# Patient Record
Sex: MALE | Race: WHITE | NOT HISPANIC OR LATINO | URBAN - METROPOLITAN AREA
[De-identification: names, ages, dates, MRNs, and addresses within clinical notes are randomized per-mention and may not be internally consistent; named-entity substitution may affect disease eponyms.]

---

## 2017-01-05 DIAGNOSIS — E78.5 DYSLIPIDEMIA: ICD-10-CM

## 2017-01-05 RX ORDER — ATORVASTATIN CALCIUM 20 MG/1
20 TABLET, FILM COATED ORAL EVERY EVENING
Qty: 30 TAB | Refills: 3 | Status: SHIPPED | OUTPATIENT
Start: 2017-01-05 | End: 2017-04-10 | Stop reason: SDUPTHER

## 2017-02-27 ENCOUNTER — OFFICE VISIT (OUTPATIENT)
Dept: MEDICAL GROUP | Facility: MEDICAL CENTER | Age: 62
End: 2017-02-27
Attending: NURSE PRACTITIONER
Payer: MEDICAID

## 2017-02-27 VITALS
RESPIRATION RATE: 16 BRPM | HEIGHT: 72 IN | OXYGEN SATURATION: 93 % | SYSTOLIC BLOOD PRESSURE: 110 MMHG | BODY MASS INDEX: 26.55 KG/M2 | TEMPERATURE: 97.9 F | HEART RATE: 88 BPM | DIASTOLIC BLOOD PRESSURE: 68 MMHG | WEIGHT: 196 LBS

## 2017-02-27 DIAGNOSIS — Z00.00 ROUTINE HEALTH MAINTENANCE: ICD-10-CM

## 2017-02-27 DIAGNOSIS — R09.81 NASAL CONGESTION: ICD-10-CM

## 2017-02-27 DIAGNOSIS — Z13.29 SCREENING FOR THYROID DISORDER: ICD-10-CM

## 2017-02-27 DIAGNOSIS — Z13.21 ENCOUNTER FOR VITAMIN DEFICIENCY SCREENING: ICD-10-CM

## 2017-02-27 DIAGNOSIS — E78.49 OTHER HYPERLIPIDEMIA: ICD-10-CM

## 2017-02-27 DIAGNOSIS — Z12.5 SCREENING FOR PROSTATE CANCER: ICD-10-CM

## 2017-02-27 DIAGNOSIS — Z86.2 HX OF IRON DEFICIENCY ANEMIA: ICD-10-CM

## 2017-02-27 DIAGNOSIS — M54.50 ACUTE MIDLINE LOW BACK PAIN WITHOUT SCIATICA: ICD-10-CM

## 2017-02-27 DIAGNOSIS — Z72.0 TOBACCO USE: ICD-10-CM

## 2017-02-27 PROCEDURE — 99213 OFFICE O/P EST LOW 20 MIN: CPT | Performed by: NURSE PRACTITIONER

## 2017-02-27 NOTE — ASSESSMENT & PLAN NOTE
Is doing back exercises daily.   Denies need for muscle relaxant.  States back feels good today.  Rarely uses Ibuprofen.

## 2017-02-27 NOTE — PROGRESS NOTES
"      Chief Complaint: follow up on multiple medical conditions.    HPI:  Jesus presents to the clinic for follow up on multiple medical conditions.  He was last seen in the clinic in August 2016.    His PMH includes:    Appendectomy  Right Knee Surgery  Tobacco Use-Smoker (Cigars0  Exertional SOB  Mild normocytic Anemia  Hyperlipidemia  Skin Lesions   Seborrheic Keratosis and Psoriasis per Colleen Casey (DERM)  Chronic Pain ( Left Hip and Low Back)  Right Shoulder DJD (Severe)  Cough-Abn CXR---> CT Scan shows RUL Scarring      Established in past with   Dermatology- Colleen Casey  Orthopedics- JOCELYN    Review of Records shows  8/10/16 Clinic Visit for nasal congestion, tobacco cessation assist w Nicoderm Patch RX, Albuterol for SOB and chronic Bronchitis/cought. Flexeril and Motrin for low back pain and left hip pain.  5/10/16 Clinic Visit, Atorvastatin refill, Motrin for pain. Albuterol RX     [place]  Report shows  No entries    Tobacco use  Pt reports he used the Nicotine patches and stopped smoking at end of Oct 2016.  States breathing is \"pretty good\" except if allergie or weather changes.    Nasal congestion  Has on and off nasal congestion.  Denies current congestion.   Has had some sneezing today.  Denies cough.    Hyperlipidemia  Pt is taking Lipitor for his high cholesterol. Denies myalgias.  We discussed rechecking labs in next few months.  I recommended that.    Low back pain  Is doing back exercises daily.   Denies need for muscle relaxant.  States back feels good today.  Rarely uses Ibuprofen.        Patient Active Problem List    Diagnosis Date Noted   • Nasal congestion 08/10/2016   • Tobacco use 05/10/2016   • Osteoarthritis of shoulder 04/07/2016   • Chronic right shoulder pain 02/16/2016   • Left foot pain 11/23/2015   • Cough 11/23/2015   • Hyperlipidemia 08/18/2015   • Skin lesions 08/18/2015   • Hip pain 05/04/2015   • Low back pain 05/04/2015       Allergies:Review of patient's allergies indicates no " "known allergies.    Current Outpatient Prescriptions   Medication Sig Dispense Refill   • atorvastatin (LIPITOR) 20 MG Tab Take 1 Tab by mouth every evening. 30 Tab 3   • albuterol 108 (90 BASE) MCG/ACT Aero Soln inhalation aerosol Inhale 2 Puffs by mouth every 6 hours as needed for Shortness of Breath. 8.5 g 3   • ibuprofen (MOTRIN) 800 MG Tab Take 1 Tab by mouth every 8 hours as needed for Mild Pain or Moderate Pain (take with food). 60 Tab 3     No current facility-administered medications for this visit.       Social History   Substance Use Topics   • Smoking status: Former Smoker -- 0.25 packs/day for 15 years   • Smokeless tobacco: Former User     Quit date: 10/31/2016   • Alcohol Use: Yes      Comment: rare, beer every couple months       Family History   Problem Relation Age of Onset   • Cancer Mother      breast cancer, Ovarian cancer   • Heart Disease Father    • Psychiatry Maternal Aunt        ROS:  Review of Systems   See HPI Above        Exam:  Blood pressure 110/68, pulse 88, temperature 36.6 °C (97.9 °F), resp. rate 16, height 1.829 m (6' 0.01\"), weight 88.905 kg (196 lb), SpO2 93 %.  General:  Well nourished, well developed male in NAD  HENT:Head is grossly normal. PERRL. Posterior Pharynx wnl. Ear canal clear and TMs normal  Neck: Supple. Trachea is midline.  Pulmonary: Clear to ausculation .  Normal effort. No rales, ronchi, or wheezing.   Cardiovascular: Regular rate and rhythm.  Abdomen-Abdomen is soft, No tenderness.  Upper extremities- Strong = . Good ROM  Lower extremities- neg for edema, redness, tenderness.  Neuro- A & O x 4. Speech clear and appropriate.     Current medications, allergies, and problem list reviewed with patient and updated in  [facility] today.    Assessment/Plan:  1. Screening for thyroid disorder  TSH   2. Encounter for vitamin deficiency screening  VITAMIN D,25 HYDROXY    VITAMIN B12   3. Other hyperlipidemia  COMP METABOLIC PANEL    LIPID PROFILE   4. Screening for " prostate cancer  PROSTATE SPECIFIC AG DIAGNOSTIC   5. Routine health maintenance  CBC WITH DIFFERENTIAL   6. Tobacco use-IN REMISSION (QUIT 10/2016) Pt to not return to smoking   7. Nasal congestion  Nasal sterile saline spray prn. Avoid allergens.   8. Hx of iron deficiency anemia  IRON/TOTAL IRON BIND    FERRITIN   9. Acute midline low back pain without sciatica  Continue gentle stretching. Motrin sparingly w food.   Follow up in 6 weeks to review lab results. Call or return if questions, concerns, or worsening condition.

## 2017-02-27 NOTE — MR AVS SNAPSHOT
"Jesus Rowe   2017 3:30 PM   Office Visit   MRN: [id number]    Department:  [facility]   Dept Phone:  [phone]    Description:  Male : 1955   Provider:  NINFA Valle           Reason for Visit     Follow-Up           Allergies as of 2017     No Known Allergies      You were diagnosed with     Screening for thyroid disorder   [V77.0.ICD-9-CM]       Encounter for vitamin deficiency screening   [202460]       Other hyperlipidemia   [0958467]       Screening for prostate cancer   [223616]       Routine health maintenance   [185709]       Tobacco use   [403596]       Nasal congestion   [583318]       Hx of iron deficiency anemia   [751223]       Acute midline low back pain without sciatica   [1121121]         Vital Signs     Blood Pressure Pulse Temperature Respirations Height Weight    110/68 mmHg 88 36.6 °C (97.9 °F) 16 1.829 m (6' 0.01\") 88.905 kg (196 lb)    Body Mass Index Oxygen Saturation Smoking Status             26.58 kg/m2 93% Former Smoker         Basic Information     Date Of Birth Sex Race Ethnicity Preferred Language    1955 Male White Non- English      Your appointments     Apr 10, 2017  1:50 PM   Established Patient with NINFA Valle   [facility]    [address] [place] [id number] [phone]           You will be receiving a confirmation call a few days before your appointment from our automated call confirmation system.              Problem List              ICD-10-CM Priority Class Noted - Resolved    Hip pain M25.559   2015 - Present    Low back pain M54.5   2015 - Present    Hyperlipidemia E78.5   2015 - Present    Skin lesions L98.9   2015 - Present    Left foot pain M79.672   2015 - Present    Cough R05   2015 - Present    Chronic right shoulder pain M25.511, G89.29   2016 - Present    Osteoarthritis of shoulder M19.019   2016 - Present    Tobacco use " Z72.0   5/10/2016 - Present    Nasal congestion R09.81   8/10/2016 - Present      Health Maintenance        Date Due Completion Dates    IMM DTaP/Tdap/Td Vaccine (1 - Tdap) 3/14/1974 ---    IMM PNEUMOCOCCAL 19-64 (ADULT) MEDIUM RISK SERIES (1 of 1 - PPSV23) 3/14/1974 ---    COLONOSCOPY 3/14/2005 ---    IMM ZOSTER VACCINE 3/14/2015 ---    IMM INFLUENZA (1) 9/1/2016 10/5/2015            Current Immunizations     Influenza Vaccine Adult HD 10/5/2015      Below and/or attached are the medications your provider expects you to take. Review all of your home medications and newly ordered medications with your provider and/or pharmacist. Follow medication instructions as directed by your provider and/or pharmacist. Please keep your medication list with you and share with your provider. Update the information when medications are discontinued, doses are changed, or new medications (including over-the-counter products) are added; and carry medication information at all times in the event of emergency situations     Allergies:  No Known Allergies          Medications  Valid as of: February 27, 2017 -  3:45 PM    Generic Name Brand Name Tablet Size Instructions for use    Albuterol Sulfate (Aero Soln) albuterol 108 (90 BASE) MCG/ACT Inhale 2 Puffs by mouth every 6 hours as needed for Shortness of Breath.        Atorvastatin Calcium (Tab) LIPITOR 20 MG Take 1 Tab by mouth every evening.        Ibuprofen (Tab) MOTRIN 800 MG Take 1 Tab by mouth every 8 hours as needed for Mild Pain or Moderate Pain (take with food).        .                 Medicines prescribed today were sent to:     [facility] [address] [address]    Phone: [phone] Fax: [phone]    Open 24 Hours?: No      Medication refill instructions:       If your prescription bottle indicates you have medication refills left, it is not necessary to call your provider’s office. Please contact your pharmacy and they  will refill your medication.    If your prescription bottle indicates you do not have any refills left, you may request refills at any time through one of the following ways: The online [employer] system (except Urgent Care), by calling your provider’s office, or by asking your pharmacy to contact your provider’s office with a refill request. Medication refills are processed only during regular business hours and may not be available until the next business day. Your provider may request additional information or to have a follow-up visit with you prior to refilling your medication.   *Please Note: Medication refills are assigned a new Rx number when refilled electronically. Your pharmacy may indicate that no refills were authorized even though a new prescription for the same medication is available at the pharmacy. Please request the medicine by name with the pharmacy before contacting your provider for a refill.        Your To Do List     Future Labs/Procedures Complete By Expires    CBC WITH DIFFERENTIAL  As directed 2/27/2018    COMP METABOLIC PANEL  As directed 2/27/2018    FERRITIN  As directed 2/27/2018    IRON/TOTAL IRON BIND  As directed 2/27/2018    LIPID PROFILE  As directed 2/27/2018    PROSTATE SPECIFIC AG DIAGNOSTIC  As directed 2/27/2018    TSH  As directed 2/27/2018    VITAMIN B12  As directed 2/27/2018    VITAMIN D,25 HYDROXY  As directed 2/27/2018      Other Notes About Your Plan     2/26/16 [facility] Dermatology appt- Seborrheic keratosis, melanocytic nevus on trunk. Sun block, Psoriasis- Hydrocortisone.  6/15 [facility] Arrived. Lipids high, TSH normal, CBC and CMP normal. Pt to start on Statin.  6/1/5 [facility] Therapy: L Hip and Low back Pain: Recommends 2x/wk 6 wks therapy.           [place] Access Code: Activation code not generated  Current [employer] Status: Active

## 2017-02-27 NOTE — ASSESSMENT & PLAN NOTE
Pt is taking Lipitor for his high cholesterol. Denies myalgias.  We discussed rechecking labs in next few months.  I recommended that.

## 2017-02-27 NOTE — ASSESSMENT & PLAN NOTE
"Pt reports he used the Nicotine patches and stopped smoking at end of Oct 2016.  States breathing is \"pretty good\" except if allergie or weather changes.  "

## 2017-04-03 ENCOUNTER — HOSPITAL ENCOUNTER (OUTPATIENT)
Dept: LAB | Facility: MEDICAL CENTER | Age: 62
End: 2017-04-03
Attending: NURSE PRACTITIONER
Payer: MEDICAID

## 2017-04-03 DIAGNOSIS — E78.49 OTHER HYPERLIPIDEMIA: ICD-10-CM

## 2017-04-03 DIAGNOSIS — Z13.29 SCREENING FOR THYROID DISORDER: ICD-10-CM

## 2017-04-03 DIAGNOSIS — Z86.2 HX OF IRON DEFICIENCY ANEMIA: ICD-10-CM

## 2017-04-03 DIAGNOSIS — Z00.00 ROUTINE HEALTH MAINTENANCE: ICD-10-CM

## 2017-04-03 DIAGNOSIS — Z13.21 ENCOUNTER FOR VITAMIN DEFICIENCY SCREENING: ICD-10-CM

## 2017-04-03 DIAGNOSIS — Z12.5 SCREENING FOR PROSTATE CANCER: ICD-10-CM

## 2017-04-03 LAB
25(OH)D3 SERPL-MCNC: 37 NG/ML (ref 30–100)
ALBUMIN SERPL BCP-MCNC: 4.2 G/DL (ref 3.2–4.9)
ALBUMIN/GLOB SERPL: 1.4 G/DL
ALP SERPL-CCNC: 57 U/L (ref 30–99)
ALT SERPL-CCNC: 15 U/L (ref 2–50)
ANION GAP SERPL CALC-SCNC: 5 MMOL/L (ref 0–11.9)
AST SERPL-CCNC: 22 U/L (ref 12–45)
BASOPHILS # BLD AUTO: 0.7 % (ref 0–1.8)
BASOPHILS # BLD: 0.03 K/UL (ref 0–0.12)
BILIRUB SERPL-MCNC: 0.4 MG/DL (ref 0.1–1.5)
BUN SERPL-MCNC: 13 MG/DL (ref 8–22)
CALCIUM SERPL-MCNC: 9.4 MG/DL (ref 8.5–10.5)
CHLORIDE SERPL-SCNC: 105 MMOL/L (ref 96–112)
CHOLEST SERPL-MCNC: 190 MG/DL (ref 100–199)
CO2 SERPL-SCNC: 28 MMOL/L (ref 20–33)
CREAT SERPL-MCNC: 0.83 MG/DL (ref 0.5–1.4)
EOSINOPHIL # BLD AUTO: 0.12 K/UL (ref 0–0.51)
EOSINOPHIL NFR BLD: 2.6 % (ref 0–6.9)
ERYTHROCYTE [DISTWIDTH] IN BLOOD BY AUTOMATED COUNT: 46.4 FL (ref 35.9–50)
FERRITIN SERPL-MCNC: 57.5 NG/ML (ref 22–322)
GFR SERPL CREATININE-BSD FRML MDRD: >60 ML/MIN/1.73 M 2
GLOBULIN SER CALC-MCNC: 3 G/DL (ref 1.9–3.5)
GLUCOSE SERPL-MCNC: 96 MG/DL (ref 65–99)
HCT VFR BLD AUTO: 40.9 % (ref 42–52)
HDLC SERPL-MCNC: 48 MG/DL
HGB BLD-MCNC: 13.5 G/DL (ref 14–18)
IMM GRANULOCYTES # BLD AUTO: 0.01 K/UL (ref 0–0.11)
IMM GRANULOCYTES NFR BLD AUTO: 0.2 % (ref 0–0.9)
IRON SATN MFR SERPL: 25 % (ref 15–55)
IRON SERPL-MCNC: 84 UG/DL (ref 50–180)
LDLC SERPL CALC-MCNC: 121 MG/DL
LYMPHOCYTES # BLD AUTO: 2.35 K/UL (ref 1–4.8)
LYMPHOCYTES NFR BLD: 51 % (ref 22–41)
MCH RBC QN AUTO: 30.7 PG (ref 27–33)
MCHC RBC AUTO-ENTMCNC: 33 G/DL (ref 33.7–35.3)
MCV RBC AUTO: 93 FL (ref 81.4–97.8)
MONOCYTES # BLD AUTO: 0.29 K/UL (ref 0–0.85)
MONOCYTES NFR BLD AUTO: 6.3 % (ref 0–13.4)
NEUTROPHILS # BLD AUTO: 1.81 K/UL (ref 1.82–7.42)
NEUTROPHILS NFR BLD: 39.2 % (ref 44–72)
NRBC # BLD AUTO: 0 K/UL
NRBC BLD AUTO-RTO: 0 /100 WBC
PLATELET # BLD AUTO: 156 K/UL (ref 164–446)
PMV BLD AUTO: 10.5 FL (ref 9–12.9)
POTASSIUM SERPL-SCNC: 4.2 MMOL/L (ref 3.6–5.5)
PROT SERPL-MCNC: 7.2 G/DL (ref 6–8.2)
PSA SERPL-MCNC: 0.33 NG/ML (ref 0–4)
RBC # BLD AUTO: 4.4 M/UL (ref 4.7–6.1)
SODIUM SERPL-SCNC: 138 MMOL/L (ref 135–145)
TIBC SERPL-MCNC: 335 UG/DL (ref 250–450)
TRIGL SERPL-MCNC: 107 MG/DL (ref 0–149)
TSH SERPL DL<=0.005 MIU/L-ACNC: 1.6 UIU/ML (ref 0.3–3.7)
VIT B12 SERPL-MCNC: 331 PG/ML (ref 211–911)
WBC # BLD AUTO: 4.6 K/UL (ref 4.8–10.8)

## 2017-04-03 PROCEDURE — 84443 ASSAY THYROID STIM HORMONE: CPT

## 2017-04-03 PROCEDURE — 82306 VITAMIN D 25 HYDROXY: CPT

## 2017-04-03 PROCEDURE — 83540 ASSAY OF IRON: CPT

## 2017-04-03 PROCEDURE — 80061 LIPID PANEL: CPT

## 2017-04-03 PROCEDURE — 82607 VITAMIN B-12: CPT

## 2017-04-03 PROCEDURE — 80053 COMPREHEN METABOLIC PANEL: CPT

## 2017-04-03 PROCEDURE — 36415 COLL VENOUS BLD VENIPUNCTURE: CPT

## 2017-04-03 PROCEDURE — 83550 IRON BINDING TEST: CPT

## 2017-04-03 PROCEDURE — 82728 ASSAY OF FERRITIN: CPT

## 2017-04-03 PROCEDURE — 84153 ASSAY OF PSA TOTAL: CPT

## 2017-04-03 PROCEDURE — 85025 COMPLETE CBC W/AUTO DIFF WBC: CPT

## 2017-04-10 ENCOUNTER — OFFICE VISIT (OUTPATIENT)
Dept: MEDICAL GROUP | Facility: MEDICAL CENTER | Age: 62
End: 2017-04-10
Attending: NURSE PRACTITIONER
Payer: MEDICAID

## 2017-04-10 VITALS
HEART RATE: 86 BPM | TEMPERATURE: 97.7 F | RESPIRATION RATE: 16 BRPM | OXYGEN SATURATION: 95 % | WEIGHT: 196 LBS | SYSTOLIC BLOOD PRESSURE: 100 MMHG | HEIGHT: 72 IN | DIASTOLIC BLOOD PRESSURE: 60 MMHG | BODY MASS INDEX: 26.55 KG/M2

## 2017-04-10 DIAGNOSIS — M54.50 CHRONIC MIDLINE LOW BACK PAIN WITHOUT SCIATICA: ICD-10-CM

## 2017-04-10 DIAGNOSIS — J42 CHRONIC BRONCHITIS, UNSPECIFIED CHRONIC BRONCHITIS TYPE (HCC): ICD-10-CM

## 2017-04-10 DIAGNOSIS — R06.02 EXERTIONAL SHORTNESS OF BREATH: ICD-10-CM

## 2017-04-10 DIAGNOSIS — M25.552 HIP PAIN, LEFT: ICD-10-CM

## 2017-04-10 DIAGNOSIS — M25.552 PAIN OF LEFT HIP JOINT: ICD-10-CM

## 2017-04-10 DIAGNOSIS — G89.29 CHRONIC MIDLINE LOW BACK PAIN WITHOUT SCIATICA: ICD-10-CM

## 2017-04-10 DIAGNOSIS — E78.2 MIXED HYPERLIPIDEMIA: ICD-10-CM

## 2017-04-10 DIAGNOSIS — Z91.09 ENVIRONMENTAL ALLERGIES: ICD-10-CM

## 2017-04-10 DIAGNOSIS — R05.9 COUGH: ICD-10-CM

## 2017-04-10 DIAGNOSIS — D69.6 THROMBOCYTOPENIA (HCC): ICD-10-CM

## 2017-04-10 DIAGNOSIS — E78.5 DYSLIPIDEMIA: ICD-10-CM

## 2017-04-10 DIAGNOSIS — D64.9 NORMOCYTIC ANEMIA: ICD-10-CM

## 2017-04-10 PROCEDURE — 99214 OFFICE O/P EST MOD 30 MIN: CPT | Performed by: NURSE PRACTITIONER

## 2017-04-10 PROCEDURE — 99213 OFFICE O/P EST LOW 20 MIN: CPT | Performed by: NURSE PRACTITIONER

## 2017-04-10 RX ORDER — CYCLOBENZAPRINE HCL 10 MG
10 TABLET ORAL
Qty: 30 TAB | Refills: 2 | Status: SHIPPED | OUTPATIENT
Start: 2017-04-10 | End: 2017-07-25 | Stop reason: SDUPTHER

## 2017-04-10 RX ORDER — LORATADINE 10 MG/1
10 TABLET ORAL DAILY
Qty: 30 TAB | Refills: 2 | Status: SHIPPED | OUTPATIENT
Start: 2017-04-10 | End: 2018-08-06 | Stop reason: SDUPTHER

## 2017-04-10 RX ORDER — ATORVASTATIN CALCIUM 20 MG/1
20 TABLET, FILM COATED ORAL EVERY EVENING
Qty: 30 TAB | Refills: 3 | Status: SHIPPED | OUTPATIENT
Start: 2017-04-10 | End: 2017-07-25 | Stop reason: SDUPTHER

## 2017-04-10 RX ORDER — ALBUTEROL SULFATE 90 UG/1
2 AEROSOL, METERED RESPIRATORY (INHALATION) EVERY 6 HOURS PRN
Qty: 8.5 G | Refills: 3 | Status: SHIPPED | OUTPATIENT
Start: 2017-04-10 | End: 2017-07-25 | Stop reason: SDUPTHER

## 2017-04-10 RX ORDER — IBUPROFEN 800 MG/1
800 TABLET ORAL EVERY 8 HOURS PRN
Qty: 60 TAB | Refills: 3 | Status: SHIPPED | OUTPATIENT
Start: 2017-04-10 | End: 2017-07-25 | Stop reason: SDUPTHER

## 2017-04-10 ASSESSMENT — PAIN SCALES - GENERAL: PAINLEVEL: 4=SLIGHT-MODERATE PAIN

## 2017-04-10 NOTE — ASSESSMENT & PLAN NOTE
Taking Lipitor 20 mg but states typically only taking 1/3 of time as ran out.  HE sometimes does not  meds on time.  Is eating more chocolate.  Discussed eating better and be constant with his medication.

## 2017-04-10 NOTE — PROGRESS NOTES
Chief Complaint: LAb Results, Left hip and knee pain intermittently with walking.    HPI:  Jesus presents to the clinic for Results and follow up on multiple medical conditions.    His PMH includes:    Appendectomy  Right Knee Surgery  Tobacco Use-Smoker (Cigars0  Exertional SOB  Mild normocytic Anemia  Hyperlipidemia  Skin Lesions   Seborrheic Keratosis and Psoriasis per Colleen Casey (DERM)  Chronic Pain ( Left Hip and Low Back)  Right Shoulder DJD (Severe)  Cough-Abn CXR---> CT Scan shows RUL Scarring      Established in past with   Dermatology- Colleen Casey  Orthopedics- JOCELYN    Review of Records shows  2/27/17 Clinic Visit for allergies, f/u on Hyperlipidemia and LBP. LAbs ordered.  8/10/16 Clinic Visit for nasal congestion, tobacco cessation assist w Nicoderm Patch RX, Albuterol for SOB and chronic Bronchitis/cought. Flexeril and Motrin for low back pain and left hip pain.  5/10/16 Clinic Visit, Atorvastatin refill, Motrin for pain. Albuterol RX     [place]  Report shows  No entries    REsults Review:    4/3/17 Labs= CBC shows mild normocytic anemia ( Hgb 13.5, Hct 40.9, MCV normal), Platelets = 156, 000 (low)                         CMP normal, TSH= 1.600, Vitamin D= 37, Vitamin B12 normal, PSA= 0.33, Lipid panel normal except LDL= 121                          Iron Studies normal    Normocytic anemia, mild  Pt has mild anemia and records show he has had this for some years.  Denies dark or bloody stools.  Last FIT test 2015. Will repeat.  Hx of alcohol abuse but not for over 10 years.  Rare use of beer in a year..    Hyperlipidemia  Taking Lipitor 20 mg but states typically only taking 1/3 of time as ran out.  HE sometimes does not  meds on time.  Is eating more chocolate.  Discussed eating better and be constant with his medication.    Thrombocytopenia (CMS-HCC)  Mild thrombocytopenia. Hx of alcohol abuse but none for over 10 years.  Denies dark or bloody stools. Denies easy bruising.   Discussed  "rechecking levels in a few months and patient to continue his   No alcohol use.    Hip pain  Pt has had some mild left hip and knee pain and walking a lot.  States got new shoes to help.   States no trauma. Taking Motrin daily 400 mg and \"helps\".  States just got new shoes a few days ago as worn out others.  Denies any trauma to hip or knee.      Environmental allergies  Mild allergies this spring.  Wants to try Claritin.  Slight sneezing.No SOB      Patient Active Problem List    Diagnosis Date Noted   • Normocytic anemia 04/10/2017   • Thrombocytopenia (CMS-HCC) 04/10/2017   • Environmental allergies 04/10/2017   • Nasal congestion 08/10/2016   • Tobacco use 05/10/2016   • Osteoarthritis of shoulder 04/07/2016   • Chronic right shoulder pain 02/16/2016   • Left foot pain 11/23/2015   • Cough 11/23/2015   • Hyperlipidemia 08/18/2015   • Skin lesions 08/18/2015   • Hip pain 05/04/2015   • Low back pain 05/04/2015       Allergies:Review of patient's allergies indicates no known allergies.    Current Outpatient Prescriptions   Medication Sig Dispense Refill   • cyclobenzaprine (FLEXERIL) 10 MG Tab Take 1 Tab by mouth 1 time daily as needed. 30 Tab 2   • atorvastatin (LIPITOR) 20 MG Tab Take 1 Tab by mouth every evening. 30 Tab 3   • albuterol 108 (90 BASE) MCG/ACT Aero Soln inhalation aerosol Inhale 2 Puffs by mouth every 6 hours as needed for Shortness of Breath. 8.5 g 3   • ibuprofen (MOTRIN) 800 MG Tab Take 1 Tab by mouth every 8 hours as needed for Mild Pain or Moderate Pain (take with food). 60 Tab 3   • loratadine (CLARITIN) 10 MG Tab Take 1 Tab by mouth every day. 30 Tab 2     No current facility-administered medications for this visit.       Social History   Substance Use Topics   • Smoking status: Former Smoker -- 0.25 packs/day for 15 years   • Smokeless tobacco: Former User     Quit date: 10/31/2016   • Alcohol Use: Yes      Comment: rare, beer every couple months       Family History   Problem Relation Age " "of Onset   • Cancer Mother      breast cancer, Ovarian cancer   • Heart Disease Father    • Psychiatry Maternal Aunt        ROS:  Review of Systems   See HPI Above        Exam:  Blood pressure 100/60, pulse 86, temperature 36.5 °C (97.7 °F), resp. rate 16, height 1.829 m (6' 0.01\"), weight 88.905 kg (196 lb), SpO2 95 %.  General:  Well nourished, well developed male in NAD  HENT:Head is grossly normal. PERRL.  Neck: Supple. Trachea is midline.  Pulmonary: Clear to ausculation .  Normal effort. No rales, ronchi, or wheezing.   Cardiovascular: Regular rate and rhythm.  Abdomen-Abdomen is soft, No tenderness.  Upper extremities- Strong = . Good ROM  Lower extremities- neg for edema, redness, tenderness. Ambulates with steady gait and good balance.  Neuro- A & O x 4. Speech clear and appropriate.     Current medications, allergies, and problem list reviewed with patient and updated in  [facility] today.    Assessment/Plan:  1. Normocytic anemia  OCCULT BLOOD FECES IMMUNOASSAY    CBC WITH DIFFERENTIAL in 2-3 months    COMP METABOLIC PANEL in 2-3 months   2. Mixed hyperlipidemia  Continue Lipitor but at a more consistent basis.  Avoid fried foods.   3. Thrombocytopenia (CMS-HCC)  CBC WITH DIFFERENTIAL    COMP METABOLIC PANEL   4. Pain of left hip joint  cyclobenzaprine (FLEXERIL) 10 MG Tab   5. Dyslipidemia  atorvastatin (LIPITOR) 20 MG Tab REFILL             6. Exertional shortness of breath  albuterol 108 (90 BASE) MCG/ACT Aero Soln inhalation aerosol REFILL   7. Hip pain, left  ibuprofen (MOTRIN) 800 MG Tab REFILL  Wear appropriate padded shoes.   8. Chronic midline low back pain without sciatica  ibuprofen (MOTRIN) 800 MG Tab   9. Environmental allergies  Claritin RX as needed   Follow up in 3 months. Call or return if questions, concerns, or worsening condition.  "

## 2017-04-10 NOTE — ASSESSMENT & PLAN NOTE
"Pt has had some mild left hip and knee pain and walking a lot.  States got new shoes to help.   States no trauma. Taking Motrin daily 400 mg and \"helps\".  States just got new shoes a few days ago as worn out others.  Denies any trauma to hip or knee.    "

## 2017-04-10 NOTE — ASSESSMENT & PLAN NOTE
Pt has mild anemia and records show he has had this for some years.  Denies dark or bloody stools.  Last FIT test 2015. Will repeat.  Hx of alcohol abuse but not for over 10 years.  Rare use of beer in a year..

## 2017-04-10 NOTE — MR AVS SNAPSHOT
"Jesus Rowe   4/10/2017 1:50 PM   Office Visit   MRN: [id number]    [facility]   Dept Phone:  [phone]    Description:  Male : 1955   Provider:  NINFA Valle           Reason for Visit     Follow-Up labs      Allergies as of 4/10/2017     No Known Allergies      You were diagnosed with     Normocytic anemia   [774141]       Mixed hyperlipidemia   [272.2.ICD-9-CM]       Thrombocytopenia (CMS-HCC)   [319664]       Pain of left hip joint   [8709931]       Dyslipidemia   [929546]       Cough   [786.2.ICD-9-CM]       Chronic bronchitis, unspecified chronic bronchitis type (CMS-HCC)   [8451855]       Exertional shortness of breath   [163439]       Hip pain, left   [352141]       Chronic midline low back pain without sciatica   [6535815]       Environmental allergies   [618182]         Vital Signs     Blood Pressure Pulse Temperature Respirations Height Weight    100/60 mmHg 86 36.5 °C (97.7 °F) 16 1.829 m (6' 0.01\") 88.905 kg (196 lb)    Body Mass Index Oxygen Saturation Smoking Status             26.58 kg/m2 95% Former Smoker         Basic Information     Date Of Birth Sex Race Ethnicity Preferred Language    1955 Male White Non- English      Problem List              ICD-10-CM Priority Class Noted - Resolved    Hip pain M25.559   2015 - Present    Low back pain M54.5   2015 - Present    Hyperlipidemia E78.5   2015 - Present    Skin lesions L98.9   2015 - Present    Left foot pain M79.672   2015 - Present    Cough R05   2015 - Present    Chronic right shoulder pain M25.511, G89.29   2016 - Present    Osteoarthritis of shoulder M19.019   2016 - Present    Tobacco use Z72.0   5/10/2016 - Present    Nasal congestion R09.81   8/10/2016 - Present    Normocytic anemia D64.9   4/10/2017 - Present    Thrombocytopenia (CMS-HCC) D69.6   4/10/2017 - Present    Environmental allergies Z91.09   4/10/2017 - Present      Health " Maintenance        Date Due Completion Dates    IMM DTaP/Tdap/Td Vaccine (1 - Tdap) 3/14/1974 ---    IMM PNEUMOCOCCAL 19-64 (ADULT) MEDIUM RISK SERIES (1 of 1 - PPSV23) 3/14/1974 ---    COLONOSCOPY 3/14/2005 ---    IMM ZOSTER VACCINE 3/14/2015 ---            Current Immunizations     Influenza Vaccine Adult HD 10/5/2015      Below and/or attached are the medications your provider expects you to take. Review all of your home medications and newly ordered medications with your provider and/or pharmacist. Follow medication instructions as directed by your provider and/or pharmacist. Please keep your medication list with you and share with your provider. Update the information when medications are discontinued, doses are changed, or new medications (including over-the-counter products) are added; and carry medication information at all times in the event of emergency situations     Allergies:  No Known Allergies          Medications  Valid as of: April 10, 2017 -  2:51 PM    Generic Name Brand Name Tablet Size Instructions for use    Albuterol Sulfate (Aero Soln) albuterol 108 (90 BASE) MCG/ACT Inhale 2 Puffs by mouth every 6 hours as needed for Shortness of Breath.        Atorvastatin Calcium (Tab) LIPITOR 20 MG Take 1 Tab by mouth every evening.        Cyclobenzaprine HCl (Tab) FLEXERIL 10 MG Take 1 Tab by mouth 1 time daily as needed.        Ibuprofen (Tab) MOTRIN 800 MG Take 1 Tab by mouth every 8 hours as needed for Mild Pain or Moderate Pain (take with food).        Loratadine (Tab) CLARITIN 10 MG Take 1 Tab by mouth every day.        .                 Medicines prescribed today were sent to:     [facility] [address] [address]    Phone: [phone] Fax: [phone]    Open 24 Hours?: No      Medication refill instructions:       If your prescription bottle indicates you have medication refills left, it is not necessary to call your provider’s office. Please  contact your pharmacy and they will refill your medication.    If your prescription bottle indicates you do not have any refills left, you may request refills at any time through one of the following ways: The online [employer] system (except Urgent Care), by calling your provider’s office, or by asking your pharmacy to contact your provider’s office with a refill request. Medication refills are processed only during regular business hours and may not be available until the next business day. Your provider may request additional information or to have a follow-up visit with you prior to refilling your medication.   *Please Note: Medication refills are assigned a new Rx number when refilled electronically. Your pharmacy may indicate that no refills were authorized even though a new prescription for the same medication is available at the pharmacy. Please request the medicine by name with the pharmacy before contacting your provider for a refill.        Your To Do List     Future Labs/Procedures Complete By Expires    CBC WITH DIFFERENTIAL  As directed 4/10/2018    COMP METABOLIC PANEL  As directed 4/10/2018    OCCULT BLOOD FECES IMMUNOASSAY  As directed 4/10/2018      Other Notes About Your Plan     2/26/16 [facility] appt- Seborrheic keratosis, melanocytic nevus on trunk. Sun block, Psoriasis- Hydrocortisone.  6/15 [facility] Arrived. Lipids high, TSH normal, CBC and CMP normal. Pt to start on Statin.  6/1/5 [facility] Phys Therapy: L Hip and Low back Pain: Recommends 2x/wk 6 wks therapy.           [employer] Access Code: Activation code not generated  Current [employer] Status: Active

## 2017-06-12 ENCOUNTER — HOSPITAL ENCOUNTER (OUTPATIENT)
Facility: MEDICAL CENTER | Age: 62
End: 2017-06-12
Attending: NURSE PRACTITIONER
Payer: MEDICAID

## 2017-06-12 PROCEDURE — 82274 ASSAY TEST FOR BLOOD FECAL: CPT

## 2017-06-13 ENCOUNTER — HOSPITAL ENCOUNTER (OUTPATIENT)
Dept: LAB | Facility: MEDICAL CENTER | Age: 62
End: 2017-06-13
Attending: NURSE PRACTITIONER
Payer: MEDICAID

## 2017-06-13 DIAGNOSIS — D69.6 THROMBOCYTOPENIA (HCC): ICD-10-CM

## 2017-06-13 DIAGNOSIS — D64.9 NORMOCYTIC ANEMIA: ICD-10-CM

## 2017-06-13 LAB
ALBUMIN SERPL BCP-MCNC: 4.1 G/DL (ref 3.2–4.9)
ALBUMIN/GLOB SERPL: 1.4 G/DL
ALP SERPL-CCNC: 63 U/L (ref 30–99)
ALT SERPL-CCNC: 22 U/L (ref 2–50)
ANION GAP SERPL CALC-SCNC: 5 MMOL/L (ref 0–11.9)
AST SERPL-CCNC: 23 U/L (ref 12–45)
BASOPHILS # BLD AUTO: 0.8 % (ref 0–1.8)
BASOPHILS # BLD: 0.05 K/UL (ref 0–0.12)
BILIRUB SERPL-MCNC: 0.5 MG/DL (ref 0.1–1.5)
BUN SERPL-MCNC: 13 MG/DL (ref 8–22)
CALCIUM SERPL-MCNC: 9.1 MG/DL (ref 8.5–10.5)
CHLORIDE SERPL-SCNC: 103 MMOL/L (ref 96–112)
CO2 SERPL-SCNC: 29 MMOL/L (ref 20–33)
CREAT SERPL-MCNC: 0.8 MG/DL (ref 0.5–1.4)
EOSINOPHIL # BLD AUTO: 0.37 K/UL (ref 0–0.51)
EOSINOPHIL NFR BLD: 5.7 % (ref 0–6.9)
ERYTHROCYTE [DISTWIDTH] IN BLOOD BY AUTOMATED COUNT: 46.3 FL (ref 35.9–50)
GFR SERPL CREATININE-BSD FRML MDRD: >60 ML/MIN/1.73 M 2
GLOBULIN SER CALC-MCNC: 3 G/DL (ref 1.9–3.5)
GLUCOSE SERPL-MCNC: 97 MG/DL (ref 65–99)
HCT VFR BLD AUTO: 42.1 % (ref 42–52)
HGB BLD-MCNC: 13.9 G/DL (ref 14–18)
IMM GRANULOCYTES # BLD AUTO: 0.01 K/UL (ref 0–0.11)
IMM GRANULOCYTES NFR BLD AUTO: 0.2 % (ref 0–0.9)
LYMPHOCYTES # BLD AUTO: 2.96 K/UL (ref 1–4.8)
LYMPHOCYTES NFR BLD: 45.6 % (ref 22–41)
MCH RBC QN AUTO: 30.5 PG (ref 27–33)
MCHC RBC AUTO-ENTMCNC: 33 G/DL (ref 33.7–35.3)
MCV RBC AUTO: 92.5 FL (ref 81.4–97.8)
MONOCYTES # BLD AUTO: 0.53 K/UL (ref 0–0.85)
MONOCYTES NFR BLD AUTO: 8.2 % (ref 0–13.4)
NEUTROPHILS # BLD AUTO: 2.57 K/UL (ref 1.82–7.42)
NEUTROPHILS NFR BLD: 39.5 % (ref 44–72)
NRBC # BLD AUTO: 0 K/UL
NRBC BLD AUTO-RTO: 0 /100 WBC
PLATELET # BLD AUTO: 181 K/UL (ref 164–446)
PMV BLD AUTO: 10.1 FL (ref 9–12.9)
POTASSIUM SERPL-SCNC: 4 MMOL/L (ref 3.6–5.5)
PROT SERPL-MCNC: 7.1 G/DL (ref 6–8.2)
RBC # BLD AUTO: 4.55 M/UL (ref 4.7–6.1)
SODIUM SERPL-SCNC: 137 MMOL/L (ref 135–145)
WBC # BLD AUTO: 6.5 K/UL (ref 4.8–10.8)

## 2017-06-13 PROCEDURE — 36415 COLL VENOUS BLD VENIPUNCTURE: CPT

## 2017-06-13 PROCEDURE — 85025 COMPLETE CBC W/AUTO DIFF WBC: CPT

## 2017-06-13 PROCEDURE — 80053 COMPREHEN METABOLIC PANEL: CPT

## 2017-06-14 DIAGNOSIS — D64.9 NORMOCYTIC ANEMIA: ICD-10-CM

## 2017-06-14 LAB — HEMOCCULT STL QL IA: NEGATIVE

## 2017-06-22 ENCOUNTER — OFFICE VISIT (OUTPATIENT)
Dept: MEDICAL GROUP | Facility: MEDICAL CENTER | Age: 62
End: 2017-06-22
Attending: NURSE PRACTITIONER
Payer: MEDICAID

## 2017-06-22 VITALS
DIASTOLIC BLOOD PRESSURE: 72 MMHG | HEIGHT: 72 IN | WEIGHT: 203 LBS | HEART RATE: 80 BPM | BODY MASS INDEX: 27.5 KG/M2 | SYSTOLIC BLOOD PRESSURE: 118 MMHG | OXYGEN SATURATION: 95 % | TEMPERATURE: 98.2 F | RESPIRATION RATE: 16 BRPM

## 2017-06-22 DIAGNOSIS — M25.511 CHRONIC RIGHT SHOULDER PAIN: ICD-10-CM

## 2017-06-22 DIAGNOSIS — D64.9 NORMOCYTIC ANEMIA: ICD-10-CM

## 2017-06-22 DIAGNOSIS — M19.011 PRIMARY OSTEOARTHRITIS OF RIGHT SHOULDER: ICD-10-CM

## 2017-06-22 DIAGNOSIS — Z91.09 ENVIRONMENTAL ALLERGIES: ICD-10-CM

## 2017-06-22 DIAGNOSIS — D69.6 THROMBOCYTOPENIA (HCC): ICD-10-CM

## 2017-06-22 DIAGNOSIS — E78.5 HYPERLIPIDEMIA, UNSPECIFIED HYPERLIPIDEMIA TYPE: ICD-10-CM

## 2017-06-22 DIAGNOSIS — G89.29 CHRONIC RIGHT SHOULDER PAIN: ICD-10-CM

## 2017-06-22 DIAGNOSIS — M25.552 PAIN OF LEFT HIP JOINT: ICD-10-CM

## 2017-06-22 DIAGNOSIS — Z72.0 TOBACCO USE: ICD-10-CM

## 2017-06-22 DIAGNOSIS — L98.9 SKIN LESIONS: ICD-10-CM

## 2017-06-22 PROCEDURE — 99212 OFFICE O/P EST SF 10 MIN: CPT | Performed by: NURSE PRACTITIONER

## 2017-06-22 PROCEDURE — 99213 OFFICE O/P EST LOW 20 MIN: CPT | Performed by: NURSE PRACTITIONER

## 2017-06-22 ASSESSMENT — PATIENT HEALTH QUESTIONNAIRE - PHQ9: CLINICAL INTERPRETATION OF PHQ2 SCORE: 0

## 2017-06-22 NOTE — ASSESSMENT & PLAN NOTE
We reviewed his labs showing improvement in his mild normocytic anemia and resolution of his slightly low platelet count.  WE also reviewed his normal FIT stool test.    Pt reports he has been eating better food and more vegetables and less sugar.

## 2017-06-22 NOTE — ASSESSMENT & PLAN NOTE
REports is now back in gym doing exercises mostly Calisthenics, some push ups and squats, burpees, planks and I recommend he not over due it as he has shoulder and back issues.

## 2017-06-22 NOTE — ASSESSMENT & PLAN NOTE
We reviewed his improved and normal Platelet count.  Pt denies any unusual or easy bruising or bleeding.

## 2017-06-22 NOTE — ASSESSMENT & PLAN NOTE
Pt reports he is taking 400 mg BID of Ibuprofen, and Flexeril 10 mg /day and helping both his hip but also low back.  Denies dark or bloody stools.

## 2017-06-22 NOTE — ASSESSMENT & PLAN NOTE
Pt today has some mild sunburn to his face.  Pt reports he has been out in the sun a lot.  His arms and legs are very tan.  We discussed importance of wearing a hat and using sun block especially on his face.

## 2017-06-22 NOTE — PROGRESS NOTES
Chief Complaint: f/u for lab results    HPI:  Jesus presents to the clinic for Results.    His PMH includes    His PMH includes:    Appendectomy  Right Knee Surgery  Tobacco Use-Smoker (Cigars)  Exertional SOB  Mild normocytic Anemia  Hyperlipidemia  Skin Lesions   Seborrheic Keratosis and Psoriasis per Colleen Casey ([facility])  Chronic Pain ( Left Hip and Low Back)  Right Shoulder DJD (Severe)  Cough-Abn CXR---> CT Scan shows RUL Scarring      Established in past with   Dermatology- Colleen Casey  Orthopedics- JOCELYN    Review of Records shows  4/10/17 Clinic visit for lab Results and left hip and knee pain with walking. Rx for Motrin, Flexeril, Claritin, Albuterol, Repeat CBC and CMP in 2-3 months  Ordered.  2/27/17 Clinic Visit for allergies, f/u on Hyperlipidemia and LBP. LAbs ordered.  8/10/16 Clinic Visit for nasal congestion, tobacco cessation assist w Nicoderm Patch RX, Albuterol for SOB and chronic Bronchitis/cought. Flexeril and Motrin for low back pain and left hip pain.  5/10/16 Clinic Visit, Atorvastatin refill, Motrin for pain. Albuterol RX     [place]  Report shows  No entries    REsults Review:    6/13/17 labs = CBC shows improving mild anemia Hgb13.9, Hct 42.1, MCV normal, Resolved Thrombocytopenia (platelets= 181,000)  CMP normal,   6/12/17 FIT Stool Test= Negative for occult blood in stool.    Previous lab work  4/3/17 Labs= CBC shows mild normocytic anemia ( Hgb 13.5, Hct 40.9, MCV normal), Platelets = 156, 000 (low)                         CMP normal, TSH= 1.600, Vitamin D= 37, Vitamin B12 normal, PSA= 0.33, Lipid panel normal except LDL= 121                           Iron Studies normal    Normocytic anemia  We reviewed his labs showing improvement in his mild normocytic anemia and resolution of his slightly low platelet count.  WE also reviewed his normal FIT stool test.    Pt reports he has been eating better food and more vegetables and less sugar.        Thrombocytopenia (CMS-HCC)  We reviewed his  "improved and normal Platelet count.  Pt denies any unusual or easy bruising or bleeding.      Hyperlipidemia  Pt reports he continues to take Lipitor for his known hyperlipidemia.  In Feb his lipid panel was improving except slight increase in his LDL.  WE discussed good and bad cholesterol and need for Jesus to reduce  Both sugar intake along with Saturated fat intake.    Hip pain  Pt reports he is taking 400 mg BID of Ibuprofen, and Flexeril 10 mg /day and helping both his hip but also low back.  Denies dark or bloody stools.    Low back pain/Right Shoulder pain  REports is now back in gym doing exercises mostly Calisthenics, some push ups and squats, burpees, planks and I recommend he not over due it as he has shoulder and back issues.  Pt reports he would like to see JOCELYN YA again as the injection into his right shoulder r/t his osteoarthritis helped reduce the stiffness and pain.  I will re-refer as he would like to go back and see Dr Davalos at [facility].    Environmental allergies  Pt reports his allergies are doing \"good\".  Intermittently using Claritin but not every day.    Tobacco use  Pt has been off cigarettes and cigars since last October  (10/2016).  Denies SOB and walking a lot and now back in Gym.      Skin lesions  Pt today has some mild sunburn to his face.  Pt reports he has been out in the sun a lot.  His arms and legs are very tan.  We discussed importance of wearing a hat and using sun block especially on his face.        Patient Active Problem List    Diagnosis Date Noted   • Normocytic anemia 04/10/2017   • Thrombocytopenia (CMS-HCC) 04/10/2017   • Environmental allergies 04/10/2017   • Nasal congestion 08/10/2016   • Tobacco use 05/10/2016   • Osteoarthritis of shoulder 04/07/2016   • Chronic right shoulder pain 02/16/2016   • Left foot pain 11/23/2015   • Cough 11/23/2015   • Hyperlipidemia 08/18/2015   • Skin lesions 08/18/2015   • Hip pain 05/04/2015   • Low back pain 05/04/2015 " "      Allergies:Review of patient's allergies indicates no known allergies.    Current Outpatient Prescriptions   Medication Sig Dispense Refill   • cyclobenzaprine (FLEXERIL) 10 MG Tab Take 1 Tab by mouth 1 time daily as needed. 30 Tab 2   • atorvastatin (LIPITOR) 20 MG Tab Take 1 Tab by mouth every evening. 30 Tab 3   • albuterol 108 (90 BASE) MCG/ACT Aero Soln inhalation aerosol Inhale 2 Puffs by mouth every 6 hours as needed for Shortness of Breath. 8.5 g 3   • ibuprofen (MOTRIN) 800 MG Tab Take 1 Tab by mouth every 8 hours as needed for Mild Pain or Moderate Pain (take with food). 60 Tab 3   • loratadine (CLARITIN) 10 MG Tab Take 1 Tab by mouth every day. 30 Tab 2     No current facility-administered medications for this visit.       Social History   Substance Use Topics   • Smoking status: Former Smoker -- 0.25 packs/day for 15 years   • Smokeless tobacco: Former User     Quit date: 10/31/2016   • Alcohol Use: Yes      Comment: rare, beer every couple months       Family History   Problem Relation Age of Onset   • Cancer Mother      breast cancer, Ovarian cancer   • Heart Disease Father    • Psychiatry Maternal Aunt        ROS:  Review of Systems   See HPI Above        Exam:  Blood pressure 118/72, pulse 80, temperature 36.8 °C (98.2 °F), resp. rate 16, height 1.829 m (6' 0.01\"), weight 92.08 kg (203 lb), SpO2 95 %.  General:  Well nourished, well developed male in NAD  HENT:Head is grossly normal. PERRL., mild sunburn to face, no blisters.  Neck: Supple. Trachea is midline.  Pulmonary: Clear to ausculation .  Normal effort. No rales, ronchi, or wheezing.   Cardiovascular: Regular rate and rhythm.  Abdomen-Abdomen is soft, No tenderness.  Upper extremities-  Good ROM  Lower extremities- neg for edema, redness, tenderness.  Neuro- A & O x 4. Speech clear and appropriate.     Current medications, allergies, and problem list reviewed with patient and updated in  [facility] today.    Assessment/Plan:  1. Normocytic " anemia  Improving- Pt to continue improved diet with green leafy vegies, lean meat.   2. Thrombocytopenia (CMS-HCC)  REsolved.    3. Hyperlipidemia, unspecified hyperlipidemia type  Continue Lipitor, Decrease sugar and saturated fat intake, continue aerobic exercise.   4. Pain of left hip joint  Continue Motrin with food and prn use of Flexeril   5. Primary osteoarthritis of right shoulder  REFERRAL TO ORTHOPEDICS(pt wants another injection, previously done at [facility])   6. Chronic right shoulder pain  Re-REFERRAL TO ORTHOPEDICS   7. Environmental allergies  Continue Claritin prn.    8. Tobacco use -In Remission-------> Quit 10/2016.Pt counseled to not start again.   9. Skin lesions  Pt counseled to use sunblock and wear hat while out in sun   Follow up in 3-6 months prn. Call or return if questions, concerns, or worsening condition.

## 2017-06-22 NOTE — ASSESSMENT & PLAN NOTE
Pt has been off cigarettes and cigars since last October  (10/2016).  Denies SOB and walking a lot and now back in Gym.

## 2017-06-22 NOTE — MR AVS SNAPSHOT
"Jesus Rowe   2017 2:30 PM   Office Visit   MRN: [id number]    Department:  [facility]   Dept Phone:  [phone]    Description:  Male : 1955   Provider:  NINFA Valle           Reason for Visit     Results labs      Allergies as of 2017     No Known Allergies      You were diagnosed with     Normocytic anemia   [460003]       Thrombocytopenia (CMS-[facility])   [153730]       Hyperlipidemia, unspecified hyperlipidemia type   [6233344]       Pain of left hip joint   [1934835]       Primary osteoarthritis of right shoulder   [840283]       Chronic right shoulder pain   [288861]       Environmental allergies   [257599]       Tobacco use   [388611]       Skin lesions   [3794724]         Vital Signs     Blood Pressure Pulse Temperature Respirations Height Weight    118/72 mmHg 80 36.8 °C (98.2 °F) 16 1.829 m (6' 0.01\") 92.08 kg (203 lb)    Body Mass Index Oxygen Saturation Smoking Status             27.53 kg/m2 95% Former Smoker         Basic Information     Date Of Birth Sex Race Ethnicity Preferred Language    1955 Male White Non- English      Problem List              ICD-10-CM Priority Class Noted - Resolved    Hip pain M25.559   2015 - Present    Low back pain M54.5   2015 - Present    Hyperlipidemia E78.5   2015 - Present    Skin lesions L98.9   2015 - Present    Left foot pain M79.672   2015 - Present    Cough R05   2015 - Present    Chronic right shoulder pain M25.511, G89.29   2016 - Present    Osteoarthritis of shoulder M19.019   2016 - Present    Tobacco use Z72.0   5/10/2016 - Present    Nasal congestion R09.81   8/10/2016 - Present    Normocytic anemia D64.9   4/10/2017 - Present    Thrombocytopenia (CMS-HCC) D69.6   4/10/2017 - Present    Environmental allergies Z91.09   4/10/2017 - Present      Health Maintenance        Date Due Completion Dates    IMM DTaP/Tdap/Td Vaccine (1 - Tdap) 3/14/1974 ---    IMM " PNEUMOCOCCAL 19-64 (ADULT) MEDIUM RISK SERIES (1 of 1 - PPSV23) 3/14/1974 ---    COLONOSCOPY 3/14/2005 ---    IMM ZOSTER VACCINE 3/14/2015 ---            Current Immunizations     Influenza Vaccine Adult HD 10/5/2015      Below and/or attached are the medications your provider expects you to take. Review all of your home medications and newly ordered medications with your provider and/or pharmacist. Follow medication instructions as directed by your provider and/or pharmacist. Please keep your medication list with you and share with your provider. Update the information when medications are discontinued, doses are changed, or new medications (including over-the-counter products) are added; and carry medication information at all times in the event of emergency situations     Allergies:  No Known Allergies          Medications  Valid as of: June 22, 2017 -  2:32 PM    Generic Name Brand Name Tablet Size Instructions for use    Albuterol Sulfate (Aero Soln) albuterol 108 (90 BASE) MCG/ACT Inhale 2 Puffs by mouth every 6 hours as needed for Shortness of Breath.        Atorvastatin Calcium (Tab) LIPITOR 20 MG Take 1 Tab by mouth every evening.        Cyclobenzaprine HCl (Tab) FLEXERIL 10 MG Take 1 Tab by mouth 1 time daily as needed.        Ibuprofen (Tab) MOTRIN 800 MG Take 1 Tab by mouth every 8 hours as needed for Mild Pain or Moderate Pain (take with food).        Loratadine (Tab) CLARITIN 10 MG Take 1 Tab by mouth every day.        .                 Medicines prescribed today were sent to:     [facility] [address] [address]    Phone: [phone] Fax: [phone]    Open 24 Hours?: No      Medication refill instructions:       If your prescription bottle indicates you have medication refills left, it is not necessary to call your provider’s office. Please contact your pharmacy and they will refill your medication.    If your prescription bottle indicates you  do not have any refills left, you may request refills at any time through one of the following ways: The online [employer] system (except Urgent Care), by calling your provider’s office, or by asking your pharmacy to contact your provider’s office with a refill request. Medication refills are processed only during regular business hours and may not be available until the next business day. Your provider may request additional information or to have a follow-up visit with you prior to refilling your medication.   *Please Note: Medication refills are assigned a new Rx number when refilled electronically. Your pharmacy may indicate that no refills were authorized even though a new prescription for the same medication is available at the pharmacy. Please request the medicine by name with the pharmacy before contacting your provider for a refill.        Referral     A referral request has been sent to our patient care coordination department. Please allow 3-5 business days for us to process this request and contact you either by phone or mail. If you do not hear from us by the 5th business day, please call us at [phone].        Other Notes About Your Plan     2/26/16 [facility] D[facility]at[facility] appt- Seborrheic keratosis, melanocytic nevus on trunk. Sun block, Psoriasis- Hydrocortisone.  6/15 [facility] Arrived. Lipids high, TSH normal, CBC and CMP normal. Pt to start on Statin.  6/1/5 [facility] Therapy: L Hip and Low back Pain: Recommends 2x/wk 6 wks therapy.           [employer] Access Code: Activation code not generated  Current [employer] Status: Active

## 2017-06-22 NOTE — ASSESSMENT & PLAN NOTE
Pt reports he continues to take Lipitor for his known hyperlipidemia.  In Feb his lipid panel was improving except slight increase in his LDL.  WE discussed good and bad cholesterol and need for Jesus to reduce  Both sugar intake along with Saturated fat intake.

## 2017-06-22 NOTE — ASSESSMENT & PLAN NOTE
"Pt reports his allergies are doing \"good\".  Intermittently using Claritin but not every day.    "

## 2017-07-20 ENCOUNTER — TELEPHONE (OUTPATIENT)
Dept: MEDICAL GROUP | Facility: MEDICAL CENTER | Age: 62
End: 2017-07-20

## 2017-07-20 NOTE — TELEPHONE ENCOUNTER
Phone Number Called: [phone] (home)     Message: spoke with patient he states that his cheeks and noise are swollen. Patient advised to go to ER. Patient scheduled for 7/25/17 to see PCP. Pt claims he will go to the ER if he needs to.     Left Message for patient to call back: no

## 2017-07-25 ENCOUNTER — OFFICE VISIT (OUTPATIENT)
Dept: MEDICAL GROUP | Facility: MEDICAL CENTER | Age: 62
End: 2017-07-25
Attending: NURSE PRACTITIONER
Payer: MEDICAID

## 2017-07-25 VITALS
HEART RATE: 80 BPM | WEIGHT: 206 LBS | DIASTOLIC BLOOD PRESSURE: 64 MMHG | TEMPERATURE: 98.1 F | BODY MASS INDEX: 27.9 KG/M2 | OXYGEN SATURATION: 95 % | SYSTOLIC BLOOD PRESSURE: 92 MMHG | HEIGHT: 72 IN | RESPIRATION RATE: 16 BRPM

## 2017-07-25 DIAGNOSIS — L67.9 HAIR PROBLEM: ICD-10-CM

## 2017-07-25 DIAGNOSIS — G89.29 CHRONIC MIDLINE LOW BACK PAIN WITHOUT SCIATICA: ICD-10-CM

## 2017-07-25 DIAGNOSIS — L98.9 SKIN LESIONS: ICD-10-CM

## 2017-07-25 DIAGNOSIS — M25.552 PAIN OF LEFT HIP JOINT: ICD-10-CM

## 2017-07-25 DIAGNOSIS — L21.9 SEBORRHEIC DERMATITIS OF SCALP: ICD-10-CM

## 2017-07-25 DIAGNOSIS — J42 CHRONIC BRONCHITIS, UNSPECIFIED CHRONIC BRONCHITIS TYPE (HCC): ICD-10-CM

## 2017-07-25 DIAGNOSIS — M54.50 CHRONIC MIDLINE LOW BACK PAIN WITHOUT SCIATICA: ICD-10-CM

## 2017-07-25 DIAGNOSIS — E78.5 DYSLIPIDEMIA: ICD-10-CM

## 2017-07-25 DIAGNOSIS — R05.9 COUGH: ICD-10-CM

## 2017-07-25 DIAGNOSIS — R06.02 EXERTIONAL SHORTNESS OF BREATH: ICD-10-CM

## 2017-07-25 DIAGNOSIS — M25.552 HIP PAIN, LEFT: ICD-10-CM

## 2017-07-25 PROCEDURE — 99213 OFFICE O/P EST LOW 20 MIN: CPT | Performed by: NURSE PRACTITIONER

## 2017-07-25 PROCEDURE — 99212 OFFICE O/P EST SF 10 MIN: CPT | Performed by: NURSE PRACTITIONER

## 2017-07-25 RX ORDER — ALBUTEROL SULFATE 90 UG/1
2 AEROSOL, METERED RESPIRATORY (INHALATION) EVERY 6 HOURS PRN
Qty: 8.5 G | Refills: 3 | Status: SHIPPED | OUTPATIENT
Start: 2017-07-25 | End: 2019-09-23 | Stop reason: SDUPTHER

## 2017-07-25 RX ORDER — CYCLOBENZAPRINE HCL 10 MG
10 TABLET ORAL
Qty: 30 TAB | Refills: 2 | Status: SHIPPED | OUTPATIENT
Start: 2017-07-25 | End: 2019-09-23

## 2017-07-25 RX ORDER — ATORVASTATIN CALCIUM 20 MG/1
20 TABLET, FILM COATED ORAL EVERY EVENING
Qty: 30 TAB | Refills: 3 | Status: SHIPPED | OUTPATIENT
Start: 2017-07-25 | End: 2017-10-02 | Stop reason: SDUPTHER

## 2017-07-25 RX ORDER — IBUPROFEN 800 MG/1
800 TABLET ORAL EVERY 12 HOURS PRN
Qty: 60 TAB | Refills: 2 | Status: SHIPPED | OUTPATIENT
Start: 2017-07-25 | End: 2019-08-09

## 2017-07-25 RX ORDER — SELENIUM SULFIDE 2.5 MG/100ML
LOTION TOPICAL
Qty: 1 BOTTLE | Refills: 0 | Status: SHIPPED | OUTPATIENT
Start: 2017-07-25 | End: 2017-10-06 | Stop reason: SDUPTHER

## 2017-07-25 RX ORDER — CYCLOBENZAPRINE HCL 10 MG
10 TABLET ORAL
Qty: 30 TAB | Refills: 2 | Status: SHIPPED | OUTPATIENT
Start: 2017-07-25 | End: 2017-07-25 | Stop reason: SDUPTHER

## 2017-07-25 NOTE — MR AVS SNAPSHOT
"Jesus Rowe   2017 4:10 PM   Office Visit   MRN: [id number]    Department:  [facility]care [facility]   Dept Phone:  [phone]    Description:  Male : 1955   Provider:  NINFA Valle           Reason for Visit     Follow-Up           Allergies as of 2017     No Known Allergies      You were diagnosed with     Pain of left hip joint   [7212585]       Skin lesions   [0222855]       Dyslipidemia   [645580]       Hip pain, left   [912097]       Chronic midline low back pain without sciatica   [2883130]       Cough   [786.2.ICD-9-CM]       Chronic bronchitis, unspecified chronic bronchitis type (CMS-HCC)   [3820730]       Exertional shortness of breath   [665296]       Hair problem   [307114]       Seborrheic dermatitis of scalp   [883539]         Vital Signs     Blood Pressure Pulse Temperature Respirations Height Weight    92/64 mmHg 80 36.7 °C (98.1 °F) 16 1.829 m (6' 0.01\") 93.441 kg (206 lb)    Body Mass Index Oxygen Saturation Smoking Status             27.93 kg/m2 95% Former Smoker         Basic Information     Date Of Birth Sex Race Ethnicity Preferred Language    1955 Male White Non- English      Problem List              ICD-10-CM Priority Class Noted - Resolved    Hip pain M25.559   2015 - Present    Low back pain M54.5   2015 - Present    Hyperlipidemia E78.5   2015 - Present    Skin lesions L98.9   2015 - Present    Left foot pain M79.672   2015 - Present    Cough R05   2015 - Present    Chronic right shoulder pain M25.511, G89.29   2016 - Present    Osteoarthritis of shoulder M19.019   2016 - Present    Tobacco use Z72.0   5/10/2016 - Present    Nasal congestion R09.81   8/10/2016 - Present    Normocytic anemia D64.9   4/10/2017 - Present    Thrombocytopenia (CMS-HCC) D69.6   4/10/2017 - Present    Environmental allergies Z91.09   4/10/2017 - Present      Health Maintenance        Date Due Completion Dates    IMM " DTaP/Tdap/Td Vaccine (1 - Tdap) 3/14/1974 ---    IMM PNEUMOCOCCAL 19-64 (ADULT) MEDIUM RISK SERIES (1 of 1 - PPSV23) 3/14/1974 ---    COLONOSCOPY 3/14/2005 ---    IMM ZOSTER VACCINE 3/14/2015 ---    IMM INFLUENZA (1) 9/1/2017 10/5/2015            Current Immunizations     Influenza Vaccine Adult HD 10/5/2015      Below and/or attached are the medications your provider expects you to take. Review all of your home medications and newly ordered medications with your provider and/or pharmacist. Follow medication instructions as directed by your provider and/or pharmacist. Please keep your medication list with you and share with your provider. Update the information when medications are discontinued, doses are changed, or new medications (including over-the-counter products) are added; and carry medication information at all times in the event of emergency situations     Allergies:  No Known Allergies          Medications  Valid as of: July 25, 2017 -  4:44 PM    Generic Name Brand Name Tablet Size Instructions for use    Albuterol Sulfate (Aero Soln) albuterol 108 (90 BASE) MCG/ACT Inhale 2 Puffs by mouth every 6 hours as needed for Shortness of Breath.        Atorvastatin Calcium (Tab) LIPITOR 20 MG Take 1 Tab by mouth every evening.        Cyclobenzaprine HCl (Tab) FLEXERIL 10 MG Take 1 Tab by mouth 1 time daily as needed.        Ibuprofen (Tab) MOTRIN 800 MG Take 1 Tab by mouth every 12 hours as needed for Mild Pain or Moderate Pain (take with food).        Loratadine (Tab) CLARITIN 10 MG Take 1 Tab by mouth every day.        Selenium Sulfide (Lotion) SELSUN 2.5 % Apply to hair 2 x per week for one week        .                 Medicines prescribed today were sent to:     [facility] [place] [address] [facility].    [address] [place] [address]    Phone: [phone] Fax: [phone]    Open 24 Hours?: No      Medication refill instructions:       If your prescription bottle indicates you have medication  refills left, it is not necessary to call your provider’s office. Please contact your pharmacy and they will refill your medication.    If your prescription bottle indicates you do not have any refills left, you may request refills at any time through one of the following ways: The online [employer] system (except Urgent Care), by calling your provider’s office, or by asking your pharmacy to contact your provider’s office with a refill request. Medication refills are processed only during regular business hours and may not be available until the next business day. Your provider may request additional information or to have a follow-up visit with you prior to refilling your medication.   *Please Note: Medication refills are assigned a new Rx number when refilled electronically. Your pharmacy may indicate that no refills were authorized even though a new prescription for the same medication is available at the pharmacy. Please request the medicine by name with the pharmacy before contacting your provider for a refill.        Other Notes About Your Plan     2/26/16 [facility] D[facility] appt- Seborrheic keratosis, melanocytic nevus on trunk. Sun block, Psoriasis- Hydrocortisone.  6/15 [facility] Arrived. Lipids high, TSH normal, CBC and CMP normal. Pt to start on Statin.  6/1/5 [facility] Phys Therapy: L Hip and Low back Pain: Recommends 2x/wk 6 wks therapy.           [employer] Access Code: Activation code not generated  Current [employer] Status: Active

## 2017-10-02 DIAGNOSIS — E78.5 DYSLIPIDEMIA: ICD-10-CM

## 2017-10-02 RX ORDER — ATORVASTATIN CALCIUM 20 MG/1
20 TABLET, FILM COATED ORAL EVERY EVENING
Qty: 30 TAB | Refills: 3 | Status: SHIPPED | OUTPATIENT
Start: 2017-10-02 | End: 2018-07-31 | Stop reason: SDUPTHER

## 2017-10-04 RX ORDER — ATORVASTATIN CALCIUM 20 MG/1
TABLET, FILM COATED ORAL
Qty: 30 TAB | Refills: 3 | Status: SHIPPED | OUTPATIENT
Start: 2017-10-04 | End: 2018-05-07 | Stop reason: SDUPTHER

## 2018-03-23 DIAGNOSIS — M54.50 CHRONIC MIDLINE LOW BACK PAIN WITHOUT SCIATICA: ICD-10-CM

## 2018-03-23 DIAGNOSIS — M25.552 HIP PAIN, LEFT: ICD-10-CM

## 2018-03-23 DIAGNOSIS — G89.29 CHRONIC MIDLINE LOW BACK PAIN WITHOUT SCIATICA: ICD-10-CM

## 2018-03-26 RX ORDER — IBUPROFEN 800 MG/1
TABLET ORAL
Qty: 60 TAB | Refills: 2 | Status: SHIPPED | OUTPATIENT
Start: 2018-03-26 | End: 2018-09-17 | Stop reason: SDUPTHER

## 2018-05-07 DIAGNOSIS — E78.5 DYSLIPIDEMIA: ICD-10-CM

## 2018-05-07 RX ORDER — ATORVASTATIN CALCIUM 20 MG/1
TABLET, FILM COATED ORAL
Qty: 30 TAB | Refills: 3 | Status: SHIPPED | OUTPATIENT
Start: 2018-05-07

## 2018-05-22 ENCOUNTER — OFFICE VISIT (OUTPATIENT)
Dept: MEDICAL GROUP | Facility: MEDICAL CENTER | Age: 63
End: 2018-05-22
Attending: NURSE PRACTITIONER
Payer: COMMERCIAL

## 2018-05-22 VITALS
DIASTOLIC BLOOD PRESSURE: 78 MMHG | TEMPERATURE: 98.3 F | SYSTOLIC BLOOD PRESSURE: 128 MMHG | RESPIRATION RATE: 18 BRPM | HEIGHT: 72 IN | HEART RATE: 68 BPM | WEIGHT: 236.6 LBS | OXYGEN SATURATION: 98 % | BODY MASS INDEX: 32.05 KG/M2

## 2018-05-22 DIAGNOSIS — E78.49 OTHER HYPERLIPIDEMIA: ICD-10-CM

## 2018-05-22 DIAGNOSIS — G89.29 CHRONIC PAIN OF LEFT KNEE: ICD-10-CM

## 2018-05-22 DIAGNOSIS — Z91.09 ENVIRONMENTAL ALLERGIES: ICD-10-CM

## 2018-05-22 DIAGNOSIS — M25.562 CHRONIC PAIN OF LEFT KNEE: ICD-10-CM

## 2018-05-22 DIAGNOSIS — Z13.1 SCREENING FOR DIABETES MELLITUS: ICD-10-CM

## 2018-05-22 DIAGNOSIS — Z13.21 ENCOUNTER FOR VITAMIN DEFICIENCY SCREENING: ICD-10-CM

## 2018-05-22 DIAGNOSIS — Z13.29 SCREENING FOR THYROID DISORDER: ICD-10-CM

## 2018-05-22 DIAGNOSIS — Z13.220 SCREENING FOR CHOLESTEROL LEVEL: ICD-10-CM

## 2018-05-22 DIAGNOSIS — Z12.5 SCREENING FOR PROSTATE CANCER: ICD-10-CM

## 2018-05-22 DIAGNOSIS — E66.9 OBESITY (BMI 30-39.9): ICD-10-CM

## 2018-05-22 DIAGNOSIS — Z13.0 SCREENING FOR IRON DEFICIENCY ANEMIA: ICD-10-CM

## 2018-05-22 PROCEDURE — 99213 OFFICE O/P EST LOW 20 MIN: CPT | Performed by: NURSE PRACTITIONER

## 2018-05-22 PROCEDURE — 99214 OFFICE O/P EST MOD 30 MIN: CPT | Performed by: NURSE PRACTITIONER

## 2018-05-22 NOTE — ASSESSMENT & PLAN NOTE
Pt reports his exercises at gym he often flares up his left knee and not able to go on Treadmill.-Discussed trying bicycle.  Hx of surgery on right knee and doing better than left knee.   States if does lunges makes his left knee sore for a few weeks.

## 2018-05-22 NOTE — ASSESSMENT & PLAN NOTE
Pt concerned as he has gained wt and has been less active.  States has been eating too much carbs and has cut back.  Surprised by weight today 236 lbs compared to 206 lbs since July    Discussed checking some labs as well including TSH and A1C

## 2018-05-22 NOTE — PROGRESS NOTES
Chief Complaint:   Chief Complaint   Patient presents with   • Annual Exam   • Orders Needed     labs       HPI:  Jesus is here today for concern about wt gain and wanting labs.    His PMH includes     Mild Anemia  Right Knee Surgery  Tobacco Use-Smoker (Cigars)  Exertional SOB  Mild normocytic Anemia  Hyperlipidemia  Skin Lesions 2' sun exposure/sunburn  Seborrheic Keratosis and Psoriasis per Colleen Casey (DERM)  Chronic Pain (Left Hip and Low Back)  Right Shoulder DJD (Severe)  Cough-Abn CXR---> CT Scan shows RUL Scarring     Established in past with   Dermatology- Colleen Casey  Orthopedics- JOCELYN     Previous REferral Approved  JOCELYN for Right shoulder pain/osteoarthritis     Review of Records shows  7/25/17 Clinic Visit for Left Hip Pain, Skin Lesion on face 2' sunburn, RX Flexeril, Motrin, Polysporin for skin, Albuterol for Exertional SOB.  Refill Lipitor. Selsun Lotion for scalp skin issue.  6/22/17 Clinic Visit for referral to Orthopedic MD as Pt wants injection into Right Shoulder  4/10/17 Clinic visit for lab Results and left hip and knee pain with walking. Rx for Motrin, Flexeril, Claritin, Albuterol, Repeat CBC and CMP in 2-3 months  Ordered.  2/27/17 Clinic Visit for allergies, f/u on Hyperlipidemia and LBP. LAbs ordered.  8/10/16 Clinic Visit for nasal congestion, tobacco cessation assist w Nicoderm Patch RX, Albuterol for SOB and chronic Bronchitis/cought. Flexeril and Motrin for low back pain and left hip pain.  5/10/16 Clinic Visit, Atorvastatin refill, Motrin for pain. Albuterol RX      [place]  Report shows  No entries    Obesity (BMI 30-39.9)  Pt concerned as he has gained wt and has been less active.  States has been eating too much carbs and has cut back.  Surprised by weight today 236 lbs compared to 206 lbs since July    Discussed checking some labs as well including TSH and A1C    Chronic pain of left knee  Pt reports his exercises at gym he often flares up his left knee and not able to go on  Treadmill.-Discussed trying bicycle.  Hx of surgery on right knee and doing better than left knee.   States if does lunges makes his left knee sore for a few weeks.    Environmental allergies  REports rare use of Claritin as allergies has not bothered him  Not sleeping outside like he was before.  Reports no concerns about allergies.      Hyperlipidemia  Hx of high cholesterol.  Is continuing to take Lipitor.  Denies myalgias.  Due for lab tests.      Patient Active Problem List    Diagnosis Date Noted   • Obesity (BMI 30-39.9) 05/22/2018   • Chronic pain of left knee 05/22/2018   • Normocytic anemia 04/10/2017   • Thrombocytopenia (HCC) 04/10/2017   • Environmental allergies 04/10/2017   • Nasal congestion 08/10/2016   • Tobacco use 05/10/2016   • Osteoarthritis of shoulder 04/07/2016   • Chronic right shoulder pain 02/16/2016   • Left foot pain 11/23/2015   • Cough 11/23/2015   • Hyperlipidemia 08/18/2015   • Skin lesions 08/18/2015   • Hip pain 05/04/2015   • Low back pain 05/04/2015       Allergies:Patient has no known allergies.    Medicines as of today:  Current Outpatient Prescriptions   Medication Sig Dispense Refill   • atorvastatin (LIPITOR) 20 MG Tab TAKE 1 TABLET ORALLY EVERY EVENING 30 Tab 3   • ibuprofen (MOTRIN) 800 MG Tab TAKE 1 TABLET ORALLY EVERY 8 HOURS IF NEEDED FOR MILD PAIN OR MODERATE PAIN *TAKE WITH FOOD* 60 Tab 2   • selenium sulfide 2.5% (SELSUN) 2.5 % Lotion Apply to hair 2 x per week for one week 1 Bottle 2   • atorvastatin (LIPITOR) 20 MG Tab Take 1 Tab by mouth every evening. 30 Tab 3   • cyclobenzaprine (FLEXERIL) 10 MG Tab Take 1 Tab by mouth 1 time daily as needed. 30 Tab 2   • ibuprofen (MOTRIN) 800 MG Tab Take 1 Tab by mouth every 12 hours as needed for Mild Pain or Moderate Pain (take with food). 60 Tab 2   • albuterol 108 (90 BASE) MCG/ACT Aero Soln inhalation aerosol Inhale 2 Puffs by mouth every 6 hours as needed for Shortness of Breath. 8.5 g 3   • loratadine (CLARITIN) 10 MG  Tab Take 1 Tab by mouth every day. 30 Tab 2     No current facility-administered medications for this visit.        Social History   Substance Use Topics   • Smoking status: Former Smoker     Packs/day: 0.25     Years: 15.00   • Smokeless tobacco: Former User     Quit date: 10/31/2016   • Alcohol use Yes      Comment: rare, beer every couple months       No past medical history on file.    Family History   Problem Relation Age of Onset   • Cancer Mother      breast cancer, Ovarian cancer   • Heart Disease Father    • Psychiatry Maternal Aunt        ROS:  Review of Systems   See HPI Above    Exam:  Blood pressure 128/78, pulse 68, temperature 36.8 °C (98.3 °F), resp. rate 18, height 1.829 m (6'), weight 107.3 kg (236 lb 9.6 oz), SpO2 98 %. Body mass index is 32.09 kg/m².    General:  Well nourished, over-weight, well developed male in NAD  HENT:Head is grossly normal. PERRL.  Neck: Supple. Trachea is midline.  Pulmonary: Clear to ausculation .  Normal effort. No rales, ronchi, or wheezing.   Cardiovascular: Regular rate and rhythm.  Abdomen-Abdomen is soft, No tenderness.  Upper extremities- Strong = . Good ROM  Lower extremities- neg for edema, redness, tenderness. Walks w steady gait and good balance.  Neuro- A & O x 4. Speech clear and appropriate.    Current medications, allergies, and problem list reviewed with patient and updated in [facility] today.    Assessment/Plan:  1. Screening for iron deficiency anemia  CBC WITH DIFFERENTIAL    IRON/TOTAL IRON BIND   2. Screening for thyroid disorder  TSH   3. Screening for diabetes mellitus  HEMOGLOBIN A1C    COMP METABOLIC PANEL   4. Encounter for vitamin deficiency screening  VITAMIN B12    VITAMIN D,25 HYDROXY   5. Screening for cholesterol level  LIPID PROFILE   6. Screening for prostate cancer  PROSTATE SPECIFIC AG SCREENING   7. Obesity (BMI 30-39.9)  Patient identified as having weight management issue.  Appropriate orders and counseling given.   8. Chronic pain  of left knee  REFERRAL TO PHYSICAL THERAPY Reason for Therapy: Eval/Treat/Report  Motrin prn w food    DX-KNEE 3 VIEWS LEFT  Ice and rest.   9. Environmental allergies  In remission   10. Other hyperlipidemia  Continue Lipitor, Lipid Panel w labs       Return in about 4 weeks (around 6/19/2018) for lab results, Imaging Results.

## 2018-05-22 NOTE — ASSESSMENT & PLAN NOTE
REports rare use of Claritin as allergies has not bothered him  Not sleeping outside like he was before.

## 2018-06-05 ENCOUNTER — PHYSICAL THERAPY (OUTPATIENT)
Dept: PHYSICAL THERAPY | Facility: REHABILITATION | Age: 63
End: 2018-06-05
Attending: NURSE PRACTITIONER
Payer: COMMERCIAL

## 2018-06-05 DIAGNOSIS — M25.562 CHRONIC PAIN OF LEFT KNEE: ICD-10-CM

## 2018-06-05 DIAGNOSIS — G89.29 CHRONIC PAIN OF LEFT KNEE: ICD-10-CM

## 2018-06-05 PROCEDURE — 97161 PT EVAL LOW COMPLEX 20 MIN: CPT

## 2018-06-05 PROCEDURE — G8979 MOBILITY GOAL STATUS: HCPCS | Mod: CI

## 2018-06-05 PROCEDURE — 97110 THERAPEUTIC EXERCISES: CPT

## 2018-06-05 PROCEDURE — G8978 MOBILITY CURRENT STATUS: HCPCS | Mod: CJ

## 2018-06-05 ASSESSMENT — ENCOUNTER SYMPTOMS
PAIN SCALE: 0
PAIN SCALE AT HIGHEST: 5

## 2018-06-05 NOTE — OP THERAPY EVALUATION
Outpatient Physical Therapy  INITIAL EVALUATION    [facility] [place]  [address], [address]  [place] [address]  Phone:  [phone]  Fax:  [phone]    Date of Evaluation: 2018    Patient: Jesus Rowe  YOB: 1955  MRN: [id number]     Referring Provider: NINFA Salguero  [address] [address]   Referring Diagnosis Chronic pain of left knee [M25.562, G89.29]     Time Calculation  Start time: 145  Stop time: 245 Time Calculation (min): 60 minutes     Physical Therapy Occurrence Codes    Date physical therapy care plan established or reviewed:  18   Date physical therapy treatment started:  18          Chief Complaint: Knee Problem    Visit Diagnoses     ICD-10-CM   1. Chronic pain of left knee M25.562    G89.29         Subjective:   History of Present Illness:     Mechanism of injury:  Pt notes for the last 6 months he has had L knee pain after exercises. His pain comes on 2-3 days after he does exercises. Pt particular;y notes that after doing lunges he has this pain. Pain is diffusely located around the knee. Pt denies locking, catching, numbness and tingling. Pt has a back issue that occasionally refers pain down the L leg and he has a R shoulder problem. Pt describes his pain as soreness that remains constant after exercise. Pt notes that it could be a week after he does exercises in the gym and then he starts hurting. Patient denies fevers/chills, numbness/weakness of lower extremities, bowel/bladder incontinence, saddle anesthesia.     Pain:     Current pain ratin    At worst pain ratin      History reviewed. No pertinent past medical history.  Past Surgical History:   Procedure Laterality Date   • APPENDECTOMY     • [id number]      Knee surgery right knee      Social History   Substance Use Topics   • Smoking status: Former Smoker     Packs/day: 0.25     Years: 15.00   • Smokeless tobacco: Former User      Quit date: 10/31/2016   • Alcohol use Yes      Comment: rare, beer every couple months     Family and Occupational History     Social History   • Marital status: Single     Spouse name: N/A   • Number of children: N/A   • Years of education: N/A       Objective     Neurological Testing     Sensation     Knee   Left Knee   Intact: light touch    Right Knee   Intact: light touch     Reflexes   Left   Patellar (L4): normal (2+)  Achilles (S1): normal (2+)  Clonus sign: negative    Right   Patellar (L4): normal (2+)  Achilles (S1): normal (2+)  Clonus sign: negative    Palpation     Additional Palpation Details  No palpable tenderness L knee    Active Range of Motion     Right Knee   Normal active range of motion    Passive Range of Motion     Right Knee   Normal passive range of motion    Patellar Static Positioning   Right Knee: WFL    Patellar Mobility     Right Knee Patellar tendons within functional limits include the medial, lateral, superior and inferior.     Strength:      Right Knee   Normal strength    Tests     Right Knee   Positive Thessaly's test at 5 degrees.   Negative anterior drawer, anterior Lachman, lateral Tracey, medial Tracey, patellar apprehension, patellar compression, pivot shift and Thessaly's test at 20 degrees.     General Comments     Knee Comments  Functional alignment: Very wide squat stance with Sean Knee valgus in relation to foot and hip. Knee valgus with step/stair observed. Poor proprioception for alignment.        Therapeutic Exercises (CPT 36152):       Therapeutic Exercise Summary: Pt instructed on proper alignment of LE during motions of squatting/ lunging and even sitting. Practice with squat in varying positions standing, shuttle/leg press/ steps/stairs. Discussion on decreased intensity       Time-based treatments/modalities:  Therapeutic exercise minutes (CPT 40281): 15 minutes       Assessment, Response and Plan:   Assessment details:  Jesus Rowe is a 63 y.o.  male with signs and symptoms consistent with recurrent exacerbation of arthralgia secondary to poor movement patterns and exercise programming. He requires skilled physical therapy intervention to increase functional mobility, improve ADL completion and establish a home exercise program.    Goals:   Short Term Goals:   1. Patient will be Independent with prescribed Home Exercise Program (HEP) and will be able to demonstrate exercises without cues for improved overall symptoms/activity tolerance.   2. Pt to squat to chair with normal alignment without cues.     Short term goal time span:  1-2 weeks      Long Term Goals:    3. Pt to return to gym 3x per week with programming as discussed without increased pain afterwards.   4. Pt will improve LEFS score to greater than 65/80 indicative of improved function and reduced percieved disability.    Long term goal time span:  2-4 weeks    Plan:   Planned therapy interventions:  Therapeutic Exercise (CPT 74307), Therapeutic Activities (CPT 91205), Neuromuscular Re-education (CPT 02865), Functional Training, Self Care (CPT 42029) and E Stim Unattended (CPT 24648)  Frequency:  1x week  Duration in weeks:  4  Discussed with:  Patient    Functional Limitation G-Codes and Severity Modifiers  PT Functional Assessment Tool Used: LEFS  PT Functional Assessment Score: 61/80   Current: Mobility: Current (): CJ   Goal: Mobility: Goal (): CI     Referring provider co-signature:  I have reviewed this plan of care and my co-signature certifies the need for services.  Certification Dates:   From 06/05/18      To 7/5/18    Physician Signature: ________________________________ Date: ______________

## 2018-06-13 ENCOUNTER — HOSPITAL ENCOUNTER (OUTPATIENT)
Dept: RADIOLOGY | Facility: MEDICAL CENTER | Age: 63
End: 2018-06-13
Attending: NURSE PRACTITIONER
Payer: COMMERCIAL

## 2018-06-13 ENCOUNTER — PHYSICAL THERAPY (OUTPATIENT)
Dept: PHYSICAL THERAPY | Facility: REHABILITATION | Age: 63
End: 2018-06-13
Attending: NURSE PRACTITIONER
Payer: COMMERCIAL

## 2018-06-13 ENCOUNTER — HOSPITAL ENCOUNTER (OUTPATIENT)
Dept: LAB | Facility: MEDICAL CENTER | Age: 63
End: 2018-06-13
Attending: NURSE PRACTITIONER
Payer: COMMERCIAL

## 2018-06-13 DIAGNOSIS — G89.29 CHRONIC PAIN OF LEFT KNEE: ICD-10-CM

## 2018-06-13 DIAGNOSIS — Z13.21 ENCOUNTER FOR VITAMIN DEFICIENCY SCREENING: ICD-10-CM

## 2018-06-13 DIAGNOSIS — Z13.1 SCREENING FOR DIABETES MELLITUS: ICD-10-CM

## 2018-06-13 DIAGNOSIS — Z13.0 SCREENING FOR IRON DEFICIENCY ANEMIA: ICD-10-CM

## 2018-06-13 DIAGNOSIS — M25.562 CHRONIC PAIN OF LEFT KNEE: ICD-10-CM

## 2018-06-13 DIAGNOSIS — Z13.220 SCREENING FOR CHOLESTEROL LEVEL: ICD-10-CM

## 2018-06-13 DIAGNOSIS — Z12.5 SCREENING FOR PROSTATE CANCER: ICD-10-CM

## 2018-06-13 DIAGNOSIS — Z13.29 SCREENING FOR THYROID DISORDER: ICD-10-CM

## 2018-06-13 LAB
25(OH)D3 SERPL-MCNC: 13 NG/ML (ref 30–100)
ALBUMIN SERPL BCP-MCNC: 4 G/DL (ref 3.2–4.9)
ALBUMIN/GLOB SERPL: 1.2 G/DL
ALP SERPL-CCNC: 63 U/L (ref 30–99)
ALT SERPL-CCNC: 23 U/L (ref 2–50)
ANION GAP SERPL CALC-SCNC: 8 MMOL/L (ref 0–11.9)
AST SERPL-CCNC: 22 U/L (ref 12–45)
BASOPHILS # BLD AUTO: 1 % (ref 0–1.8)
BASOPHILS # BLD: 0.06 K/UL (ref 0–0.12)
BILIRUB SERPL-MCNC: 0.5 MG/DL (ref 0.1–1.5)
BUN SERPL-MCNC: 13 MG/DL (ref 8–22)
CALCIUM SERPL-MCNC: 9.4 MG/DL (ref 8.5–10.5)
CHLORIDE SERPL-SCNC: 107 MMOL/L (ref 96–112)
CHOLEST SERPL-MCNC: 213 MG/DL (ref 100–199)
CO2 SERPL-SCNC: 25 MMOL/L (ref 20–33)
CREAT SERPL-MCNC: 0.92 MG/DL (ref 0.5–1.4)
EOSINOPHIL # BLD AUTO: 0.18 K/UL (ref 0–0.51)
EOSINOPHIL NFR BLD: 3.1 % (ref 0–6.9)
ERYTHROCYTE [DISTWIDTH] IN BLOOD BY AUTOMATED COUNT: 44.1 FL (ref 35.9–50)
EST. AVERAGE GLUCOSE BLD GHB EST-MCNC: 111 MG/DL
GLOBULIN SER CALC-MCNC: 3.4 G/DL (ref 1.9–3.5)
GLUCOSE SERPL-MCNC: 85 MG/DL (ref 65–99)
HBA1C MFR BLD: 5.5 % (ref 0–5.6)
HCT VFR BLD AUTO: 42.3 % (ref 42–52)
HDLC SERPL-MCNC: 40 MG/DL
HGB BLD-MCNC: 13.8 G/DL (ref 14–18)
IMM GRANULOCYTES # BLD AUTO: 0.01 K/UL (ref 0–0.11)
IMM GRANULOCYTES NFR BLD AUTO: 0.2 % (ref 0–0.9)
IRON SATN MFR SERPL: 33 % (ref 15–55)
IRON SERPL-MCNC: 112 UG/DL (ref 50–180)
LDLC SERPL CALC-MCNC: 148 MG/DL
LYMPHOCYTES # BLD AUTO: 2.95 K/UL (ref 1–4.8)
LYMPHOCYTES NFR BLD: 51.1 % (ref 22–41)
MCH RBC QN AUTO: 30.2 PG (ref 27–33)
MCHC RBC AUTO-ENTMCNC: 32.6 G/DL (ref 33.7–35.3)
MCV RBC AUTO: 92.6 FL (ref 81.4–97.8)
MONOCYTES # BLD AUTO: 0.47 K/UL (ref 0–0.85)
MONOCYTES NFR BLD AUTO: 8.1 % (ref 0–13.4)
NEUTROPHILS # BLD AUTO: 2.1 K/UL (ref 1.82–7.42)
NEUTROPHILS NFR BLD: 36.5 % (ref 44–72)
NRBC # BLD AUTO: 0 K/UL
NRBC BLD-RTO: 0 /100 WBC
PLATELET # BLD AUTO: 170 K/UL (ref 164–446)
PMV BLD AUTO: 10.7 FL (ref 9–12.9)
POTASSIUM SERPL-SCNC: 3.7 MMOL/L (ref 3.6–5.5)
PROT SERPL-MCNC: 7.4 G/DL (ref 6–8.2)
PSA SERPL-MCNC: 0.33 NG/ML (ref 0–4)
RBC # BLD AUTO: 4.57 M/UL (ref 4.7–6.1)
SODIUM SERPL-SCNC: 140 MMOL/L (ref 135–145)
TIBC SERPL-MCNC: 343 UG/DL (ref 250–450)
TRIGL SERPL-MCNC: 126 MG/DL (ref 0–149)
TSH SERPL DL<=0.005 MIU/L-ACNC: 3.54 UIU/ML (ref 0.38–5.33)
VIT B12 SERPL-MCNC: 276 PG/ML (ref 211–911)
WBC # BLD AUTO: 5.8 K/UL (ref 4.8–10.8)

## 2018-06-13 PROCEDURE — 84153 ASSAY OF PSA TOTAL: CPT

## 2018-06-13 PROCEDURE — 73562 X-RAY EXAM OF KNEE 3: CPT | Mod: LT

## 2018-06-13 PROCEDURE — 83550 IRON BINDING TEST: CPT

## 2018-06-13 PROCEDURE — 80053 COMPREHEN METABOLIC PANEL: CPT

## 2018-06-13 PROCEDURE — 85025 COMPLETE CBC W/AUTO DIFF WBC: CPT

## 2018-06-13 PROCEDURE — 83540 ASSAY OF IRON: CPT

## 2018-06-13 PROCEDURE — 82607 VITAMIN B-12: CPT

## 2018-06-13 PROCEDURE — 36415 COLL VENOUS BLD VENIPUNCTURE: CPT

## 2018-06-13 PROCEDURE — 84443 ASSAY THYROID STIM HORMONE: CPT

## 2018-06-13 PROCEDURE — 80061 LIPID PANEL: CPT

## 2018-06-13 PROCEDURE — 82306 VITAMIN D 25 HYDROXY: CPT

## 2018-06-13 PROCEDURE — 97110 THERAPEUTIC EXERCISES: CPT

## 2018-06-13 PROCEDURE — 83036 HEMOGLOBIN GLYCOSYLATED A1C: CPT

## 2018-06-13 NOTE — OP THERAPY DAILY TREATMENT
Outpatient Physical Therapy  DAILY TREATMENT     [facility] [place]  [address], [address]  [place] [facility] [address]  Phone:  [phone]  Fax:  [phone]    Date: 06/13/2018    Patient: Jesus Rowe  YOB: 1955  MRN: [id number]     Time Calculation  Start time: 1515  Stop time: 1545 Time Calculation (min): 30 minutes     Chief Complaint: L knee problem  Visit #: 2    SUBJECTIVE:  Pt arrives to therapy session via bicycle. C/O general pain around knee after strenuous exercise in gym. Went to get x-ray today. Pt has not returned to the gym.     OBJECTIVE:  Current objective measures: small floating osteophyte seen posterior knee on x-ray. ROM and strength WNL.           Therapeutic Exercises (CPT 54373):     1. Squatting with correct form, standing, leg press    2. Single leg stability movements, able to complete without aggravation.       Therapeutic Exercise Summary: Pt again instructed to return to gym routine 1x before next visit with 50% decreased load. Half weight and and half reps from previously exacerbated workout. Pt again instructed on delayed recovery and exacerbative movements in gym. Pt able to complete all movements without reported pain in session.          Time-based treatments/modalities:  Therapeutic exercise minutes (CPT 44456): 30 minutes       Pain rating before treatment: 0  Pain rating after treatment: 0    ASSESSMENT:   Response to treatment: Poor recovery from exercises suspected due to too great of workload. Pt instructed as above. Suspect non compliance. Subjective> objective.      PLAN/RECOMMENDATIONS:   Plan for treatment: therapy treatment to continue next visit. D/C at next visit with further instruction in load increase.

## 2018-06-14 DIAGNOSIS — L21.9 SEBORRHEIC DERMATITIS OF SCALP: ICD-10-CM

## 2018-06-14 DIAGNOSIS — L67.9 HAIR PROBLEM: ICD-10-CM

## 2018-06-14 RX ORDER — SELENIUM SULFIDE 2.5 MG/100ML
LOTION TOPICAL
Qty: 1 BOTTLE | Refills: 2 | Status: SHIPPED | OUTPATIENT
Start: 2018-06-14 | End: 2018-10-23 | Stop reason: SDUPTHER

## 2018-06-14 NOTE — TELEPHONE ENCOUNTER
Was the patient seen in the last year in this department? Yes     Does patient have an active prescription for medications requested? No     Received Request Via: Pharmacy       Future Appointments       Provider [facility]    6/19/2018 2:10 PM NINFA Salguero [facility]    6/27/2018 2:45 PM Zach Perez, PT, DPT [facility] [place] [facility]

## 2018-06-19 ENCOUNTER — OFFICE VISIT (OUTPATIENT)
Dept: MEDICAL GROUP | Facility: MEDICAL CENTER | Age: 63
End: 2018-06-19
Attending: NURSE PRACTITIONER
Payer: COMMERCIAL

## 2018-06-19 VITALS
DIASTOLIC BLOOD PRESSURE: 66 MMHG | OXYGEN SATURATION: 94 % | HEART RATE: 64 BPM | HEIGHT: 72 IN | TEMPERATURE: 98 F | SYSTOLIC BLOOD PRESSURE: 114 MMHG | BODY MASS INDEX: 32.1 KG/M2 | WEIGHT: 237 LBS | RESPIRATION RATE: 16 BRPM

## 2018-06-19 DIAGNOSIS — E53.8 VITAMIN B12 DEFICIENCY: ICD-10-CM

## 2018-06-19 DIAGNOSIS — E78.49 OTHER HYPERLIPIDEMIA: ICD-10-CM

## 2018-06-19 DIAGNOSIS — G89.29 CHRONIC PAIN OF LEFT KNEE: ICD-10-CM

## 2018-06-19 DIAGNOSIS — E55.9 VITAMIN D DEFICIENCY: ICD-10-CM

## 2018-06-19 DIAGNOSIS — M25.562 CHRONIC PAIN OF LEFT KNEE: ICD-10-CM

## 2018-06-19 PROCEDURE — 99213 OFFICE O/P EST LOW 20 MIN: CPT | Performed by: NURSE PRACTITIONER

## 2018-06-19 PROCEDURE — 99214 OFFICE O/P EST MOD 30 MIN: CPT | Performed by: NURSE PRACTITIONER

## 2018-06-19 RX ORDER — ERGOCALCIFEROL 1.25 MG/1
50000 CAPSULE ORAL
Qty: 4 CAP | Refills: 5 | Status: SHIPPED | OUTPATIENT
Start: 2018-06-19 | End: 2019-02-04 | Stop reason: SDUPTHER

## 2018-06-19 RX ORDER — CHOLECALCIFEROL (VITAMIN D3) 125 MCG
500 CAPSULE ORAL DAILY
Qty: 30 TAB | Refills: 3 | Status: SHIPPED | OUTPATIENT
Start: 2018-06-19

## 2018-06-19 ASSESSMENT — PAIN SCALES - GENERAL: PAINLEVEL: NO PAIN

## 2018-06-19 ASSESSMENT — PATIENT HEALTH QUESTIONNAIRE - PHQ9: CLINICAL INTERPRETATION OF PHQ2 SCORE: 0

## 2018-06-19 NOTE — ASSESSMENT & PLAN NOTE
Vitamin D level reviewed.  Discussed why optimal level of Vitamin D is important to health  Recommended Supplemental Vitamin D

## 2018-06-19 NOTE — PROGRESS NOTES
Chief Complaint:   Chief Complaint   Patient presents with   • Results     labs       HPI:  Jesus is here today for a follow-up on on knee pain and weight issues, Results    His PMH includes     Mild Anemia  Right Knee Surgery  Tobacco Use-Smoker (Cigars)  Exertional SOB  Mild normocytic Anemia  Hyperlipidemia  Skin Lesions 2' sun exposure/sunburn  Seborrheic Keratosis and Psoriasis per Colleen Casey (DERM)  Chronic Pain (Left Hip and Low Back)  Right Shoulder DJD (Severe)  Cough-Abn CXR---> CT Scan shows RUL Scarring     Established in past with   Dermatology- Colleen Casey  Orthopedics- JOCELYN     Previous REferral Approved  JOCELYN for Right shoulder pain/osteoarthritis  Physical Therapy     Review of Records shows  7/25/17 Clinic Visit for Left Hip Pain, Skin Lesion on face 2' sunburn, RX Flexeril, Motrin, Polysporin for skin, Albuterol for Exertional SOB.  Refill Lipitor. Selsun Lotion for scalp skin issue.  6/22/17 Clinic Visit for referral to Orthopedic MD as Pt wants injection into Right Shoulder  4/10/17 Clinic visit for lab Results and left hip and knee pain with walking. Rx for Motrin, Flexeril, Claritin, Albuterol, Repeat CBC and CMP in 2-3 months  Ordered.  2/27/17 Clinic Visit for allergies, f/u on Hyperlipidemia and LBP. LAbs ordered.  8/10/16 Clinic Visit for nasal congestion, tobacco cessation assist w Nicoderm Patch RX, Albuterol for SOB and chronic Bronchitis/cought. Flexeril and Motrin for low back pain and left hip pain.  5/10/16 Clinic Visit, Atorvastatin refill, Motrin for pain. Albuterol RX   5/22/18 Clinic visit for concern about Obesity, Chronic Left knee pain. --> labs ordered, Xray left knee ordered. Referral to Physical Therapy.  OTC MOTRIN AND ICE  6/13/18 Labs CBC shows very mild anemia Hgb= 13.8, CMP normal, Iron Levels normal, A1C= 5.5( ~ 111).  Cholest= 213, Qqxsw=532, HDL= 40, LDL= 148, Vit D= 13 (low), Vit B12= 276.   TSH= 3.540 Previous TSH = 1.600 on 4/3/17  6/13/18 Left knee Xray=  "normal     [place]  Report shows  No entries    Vitamin D deficiency  Vitamin D level reviewed.  Discussed why optimal level of Vitamin D is important to health  Recommended Supplemental Vitamin D    Chronic pain of left knee  Normal Xray discussed  Pt has been to Physical Therapy on 6/3 and 6/13.  Reports he feels 'something internal\" wrong with his knee  Is bicycle riding and is going to PT and reports is helping.   Reports if lifts weights it hurts his knee.    Recommend he not lift so much heavy wt and ice knee as able.     Hyperlipidemia  Lipid panel reviewed.   Continues on Atorvastatin 20 mg, but missed a week prior to appt.  No myalgias  Recommend decrease in saturated fats in diet.  Eats a lot of icecream, recommend he not do icecream.    Vitamin B12 deficiency  Borderline low Vit b12  Recommend supplement  Dark green leafy vegies.  Not eating red meat.      Patient Active Problem List    Diagnosis Date Noted   • Vitamin D deficiency 06/19/2018   • Vitamin B12 deficiency 06/19/2018   • Obesity (BMI 30-39.9) 05/22/2018   • Chronic pain of left knee 05/22/2018   • Normocytic anemia 04/10/2017   • Thrombocytopenia (HCC) 04/10/2017   • Environmental allergies 04/10/2017   • Nasal congestion 08/10/2016   • Tobacco use 05/10/2016   • Osteoarthritis of shoulder 04/07/2016   • Chronic right shoulder pain 02/16/2016   • Left foot pain 11/23/2015   • Cough 11/23/2015   • Hyperlipidemia 08/18/2015   • Skin lesions 08/18/2015   • Hip pain 05/04/2015   • Low back pain 05/04/2015       Allergies:Patient has no known allergies.    Medicines as of today:  Current Outpatient Prescriptions   Medication Sig Dispense Refill   • cyanocobalamin (VITAMIN B-12) 500 MCG Tab Take 1 Tab by mouth every day. 30 Tab 3   • ergocalciferol (DRISDOL) 28671 UNIT capsule Take 1 Cap by mouth every 7 days. 4 Cap 5   • selenium sulfide 2.5% (SELSUN) 2.5 % Lotion APPLY TO HAIR TWO TIMES PER WEEK FOR 1 WEEK 1 Bottle 2   • atorvastatin (LIPITOR) " "20 MG Tab TAKE 1 TABLET ORALLY EVERY EVENING 30 Tab 3   • ibuprofen (MOTRIN) 800 MG Tab TAKE 1 TABLET ORALLY EVERY 8 HOURS IF NEEDED FOR MILD PAIN OR MODERATE PAIN *TAKE WITH FOOD* 60 Tab 2   • atorvastatin (LIPITOR) 20 MG Tab Take 1 Tab by mouth every evening. 30 Tab 3   • cyclobenzaprine (FLEXERIL) 10 MG Tab Take 1 Tab by mouth 1 time daily as needed. 30 Tab 2   • ibuprofen (MOTRIN) 800 MG Tab Take 1 Tab by mouth every 12 hours as needed for Mild Pain or Moderate Pain (take with food). 60 Tab 2   • albuterol 108 (90 BASE) MCG/ACT Aero Soln inhalation aerosol Inhale 2 Puffs by mouth every 6 hours as needed for Shortness of Breath. 8.5 g 3   • loratadine (CLARITIN) 10 MG Tab Take 1 Tab by mouth every day. 30 Tab 2     No current facility-administered medications for this visit.        Social History   Substance Use Topics   • Smoking status: Former Smoker     Packs/day: 0.25     Years: 15.00   • Smokeless tobacco: Former User     Quit date: 10/31/2016   • Alcohol use Yes      Comment: rare, beer every couple months       History reviewed. No pertinent past medical history.    Family History   Problem Relation Age of Onset   • Cancer Mother      breast cancer, Ovarian cancer   • Heart Disease Father    • Psychiatry Maternal Aunt        ROS:  Review of Systems   See HPI Above    Exam:  Blood pressure 114/66, pulse 64, temperature 36.7 °C (98 °F), resp. rate 16, height 1.829 m (6' 0.01\"), weight 107.5 kg (237 lb), SpO2 94 %. Body mass index is 32.14 kg/m².    General:  Well nourished, over-weight,  well developed male in NAD  HENT:Head is grossly normal. PERRL.  Neck: Supple. Trachea is midline.  Pulmonary: Clear to ausculation .  Normal effort. No rales, ronchi, or wheezing.   Cardiovascular: Regular rate and rhythm.  Abdomen-Abdomen is soft, No tenderness.  Upper extremities-  Good ROM  Lower extremities- neg for edema, redness, tenderness.  Neuro- A & O x 4. Speech clear and appropriate.    Current medications, " allergies, and problem list reviewed with patient and updated in [facility] today.    Assessment/Plan:  1. Vitamin D deficiency  ergocalciferol (DRISDOL) 87530 UNIT capsule   2. Chronic pain of left knee  Ice, continue Physical Therapy. Motrin sparingly w food.   3. Other hyperlipidemia  Continue Atorvastatin at current dose, Stop night time icecream and cut back on sugar/carbs and Saturated fats.    4. Vitamin B12 deficiency  cyanocobalamin (VITAMIN B-12) 500 MCG Tab       Return in about 6 weeks (around 7/31/2018) for f/u on weight issue, and knee.

## 2018-06-19 NOTE — ASSESSMENT & PLAN NOTE
"Normal Xray discussed  Pt has been to Physical Therapy on 6/3 and 6/13.  Reports he feels 'something internal\" wrong with his knee  Is bicycle riding and is going to PT and reports is helping.   Reports if lifts weights it hurts his knee.    Recommend he not lift so much heavy wt and ice knee as able.   "

## 2018-06-19 NOTE — ASSESSMENT & PLAN NOTE
Lipid panel reviewed.   Continues on Atorvastatin 20 mg, but missed a week prior to appt.  No myalgias  Recommend decrease in saturated fats in diet.  Eats a lot of icecream, recommend he not do icecream.

## 2018-06-27 ENCOUNTER — PHYSICAL THERAPY (OUTPATIENT)
Dept: PHYSICAL THERAPY | Facility: REHABILITATION | Age: 63
End: 2018-06-27
Attending: NURSE PRACTITIONER
Payer: COMMERCIAL

## 2018-06-27 DIAGNOSIS — M25.562 CHRONIC PAIN OF LEFT KNEE: ICD-10-CM

## 2018-06-27 DIAGNOSIS — G89.29 CHRONIC PAIN OF LEFT KNEE: ICD-10-CM

## 2018-06-27 PROCEDURE — 97110 THERAPEUTIC EXERCISES: CPT

## 2018-06-27 NOTE — OP THERAPY DAILY TREATMENT
Outpatient Physical Therapy  DAILY TREATMENT     [facility] Outpatient Physical Therapy [place]  [address], [address]  [place] [address]  Phone:  [phone]  Fax:  [phone]    Date: 06/27/2018    Patient: Jesus Rowe  YOB: 1955  MRN: [id number]     Time Calculation  Start time: 0245  Stop time: 0315 Time Calculation (min): 30 minutes     Chief Complaint: L knee problem  Visit #: 3    SUBJECTIVE:  Pt arrives to therapy session via bicycle. No c/o pain today. Pt fixated on personal interactions he experienced in gym with other persons. Pt states that something internal is wrong with his knee. Unable to provoke pain with daily activities lately.     OBJECTIVE:  Current objective measures: small floating osteophyte seen posterior knee on x-ray. ROM and strength WNL. - mcmurrays, - thessaly,         Therapeutic Exercises (CPT 96367):     1. Squatting with correct form, standing, leg press 8c x 55    2. Single leg stability movements, able to complete without aggravation.     3. Reverese lunges, x 3 each without pain      Therapeutic Exercise Summary: Pt again instructed  On safe progression of loading and return to regular exercise with no greater then 10% increase in load or reps at a time to assess pts tolerance and reaction to that specific load. Pt instructed in recovery times and normal expectations of muscle soreness.       Time-based treatments/modalities:  Therapeutic exercise minutes (CPT 30066): 30 minutes       Pain rating before treatment: 0  Pain rating after treatment: 0    ASSESSMENT:   Response to treatment: unable to provoke pain, no functional limitation observed. Pt to be discharged as pt can complete ADLs without difficulty.     PLAN/RECOMMENDATIONS:   Plan for treatment: no further treatment needed, D/C.

## 2018-06-28 NOTE — OP THERAPY DISCHARGE SUMMARY
Outpatient Physical Therapy  DISCHARGE SUMMARY NOTE      [facility] [place]  [address]  [place] [address]  Phone:  [phone]  Fax:  [phone]    Date of Visit: 06/27/2018    Patient: Jesus Rowe  YOB: 1955  MRN: [id number]     Referring Provider: NINFA Salguero  [address] [address]   Referring Diagnosis Pain in left knee [M25.562];Other chronic pain [G89.29]     Physical Therapy Occurrence Codes    Date physical therapy care plan established or reviewed:  6/5/18   Date physical therapy treatment started:  6/5/18          Functional Limitation G-Codes and Severity Modifiers  PT Functional Assessment Tool Used: LEFS  PT Functional Assessment Score: 71/80       Your patient is being discharged from Physical Therapy with the following comments:   · Goals met    Comments:   unable to reproduce pain in clinic. No functional limitation noted. Pt to be discharged with instruction on safe loading/advancement of exercises.     Limitations Remaining:  none    Recommendations:  Psychological referral     Zach Perez, PT, DPT    Date: 6/27/2018

## 2018-07-31 ENCOUNTER — OFFICE VISIT (OUTPATIENT)
Dept: MEDICAL GROUP | Facility: MEDICAL CENTER | Age: 63
End: 2018-07-31
Attending: NURSE PRACTITIONER
Payer: COMMERCIAL

## 2018-07-31 VITALS
SYSTOLIC BLOOD PRESSURE: 112 MMHG | DIASTOLIC BLOOD PRESSURE: 60 MMHG | HEART RATE: 76 BPM | HEIGHT: 72 IN | RESPIRATION RATE: 16 BRPM | BODY MASS INDEX: 30.88 KG/M2 | TEMPERATURE: 98.5 F | WEIGHT: 228 LBS | OXYGEN SATURATION: 94 %

## 2018-07-31 DIAGNOSIS — E66.9 OBESITY (BMI 30-39.9): ICD-10-CM

## 2018-07-31 DIAGNOSIS — M25.562 CHRONIC PAIN OF LEFT KNEE: ICD-10-CM

## 2018-07-31 DIAGNOSIS — E78.5 DYSLIPIDEMIA: ICD-10-CM

## 2018-07-31 DIAGNOSIS — G89.29 CHRONIC PAIN OF LEFT KNEE: ICD-10-CM

## 2018-07-31 PROCEDURE — 99213 OFFICE O/P EST LOW 20 MIN: CPT | Performed by: NURSE PRACTITIONER

## 2018-07-31 RX ORDER — ATORVASTATIN CALCIUM 20 MG/1
20 TABLET, FILM COATED ORAL EVERY EVENING
Qty: 90 TAB | Refills: 2 | Status: SHIPPED | OUTPATIENT
Start: 2018-07-31 | End: 2019-09-23

## 2018-07-31 NOTE — ASSESSMENT & PLAN NOTE
"Patient reports he is doing a reverse lunge from PT exercises and states is \"sore\" but has good ROM and no swelling.   Also working on posture.   Is taking Ibuprofen 400 mg BID and helps.  \"without that I would be swollen\"    REports some days are worse than others/  Rides bike and walks every day.    Completed physical therapy.  "

## 2018-07-31 NOTE — PROGRESS NOTES
"Chief Complaint:   Chief Complaint   Patient presents with   • Weight Loss   • Knee Pain     left        HPI:  Jesus is here today for a follow-up on his concerns about his weight and left knee.    His PMH includes     Mild Anemia  Right Knee Surgery  Tobacco Use-Smoker (Cigars)  Exertional SOB  Mild normocytic Anemia  Hyperlipidemia  Skin Lesions 2' sun exposure/sunburn  Seborrheic Keratosis and Psoriasis per Colleen Casey (DERM)  Chronic Pain (Left Hip and Low Back)  Right Shoulder DJD (Severe)  Cough-Abn CXR---> CT Scan shows RUL Scarring  Left knee pain, normal xrays  Over-weight     Established with:  Orthopedics- JOCELYN  [facility]        Review of Records:    5/22/18 Clinic visit for concern about Obesity, Chronic Left knee pain. --> labs ordered, Xray left knee ordered. Referral to Physical Therapy.  OTC MOTRIN AND ICE  6/13/18 Labs CBC shows very mild anemia Hgb= 13.8, CMP normal, Iron Levels normal, A1C= 5.5( ~ 111).  Cholest= 213, Jrdkr=663, HDL= 40, LDL= 148, Vit D= 13 (low), Vit B12= 276.   TSH= 3.540 Previous TSH = 1.600 on 4/3/17  6/13/18 Left knee Xray= normal  6/19/18 Clinic visit for f/u on knee pain, weight concerns. Results. RX for Vit D, RX for Vit B12  6/27/18 Physical Therapy appt for knee pain.    Chronic pain of left knee  Patient reports he is doing a reverse lunge from PT exercises and states is \"sore\" but has good ROM and no swelling.   Also working on posture.   Is taking Ibuprofen 400 mg BID and helps.  \"without that I would be swollen\"    REports some days are worse than others/  Rides bike and walks every day.    Completed physical therapy.    Obesity (BMI 30-39.9)  todays weight= 228 lbs compared to 6/19/18 = 237 lbs.  Reports he bought a scale and is checking wt at home  Is doing intermittent fasting and eats only through 8 hrs.  Is eating mostly vegetarian and \"still too many carbs still\".     Discussed to continue decrease his grain and pasta\carb input.  States eats cheese " mike and recommend he cut out.         Patient Active Problem List    Diagnosis Date Noted   • Vitamin D deficiency 06/19/2018   • Vitamin B12 deficiency 06/19/2018   • Obesity (BMI 30-39.9) 05/22/2018   • Chronic pain of left knee 05/22/2018   • Normocytic anemia 04/10/2017   • Thrombocytopenia (HCC) 04/10/2017   • Environmental allergies 04/10/2017   • Nasal congestion 08/10/2016   • Tobacco use 05/10/2016   • Osteoarthritis of shoulder 04/07/2016   • Chronic right shoulder pain 02/16/2016   • Left foot pain 11/23/2015   • Cough 11/23/2015   • Hyperlipidemia 08/18/2015   • Skin lesions 08/18/2015   • Hip pain 05/04/2015   • Low back pain 05/04/2015       Allergies:Patient has no known allergies.    Medicines as of today:  Current Outpatient Prescriptions   Medication Sig Dispense Refill   • atorvastatin (LIPITOR) 20 MG Tab Take 1 Tab by mouth every evening. 90 Tab 2   • cyanocobalamin (VITAMIN B-12) 500 MCG Tab Take 1 Tab by mouth every day. 30 Tab 3   • ergocalciferol (DRISDOL) 74346 UNIT capsule Take 1 Cap by mouth every 7 days. 4 Cap 5   • selenium sulfide 2.5% (SELSUN) 2.5 % Lotion APPLY TO HAIR TWO TIMES PER WEEK FOR 1 WEEK 1 Bottle 2   • atorvastatin (LIPITOR) 20 MG Tab TAKE 1 TABLET ORALLY EVERY EVENING 30 Tab 3   • ibuprofen (MOTRIN) 800 MG Tab TAKE 1 TABLET ORALLY EVERY 8 HOURS IF NEEDED FOR MILD PAIN OR MODERATE PAIN *TAKE WITH FOOD* 60 Tab 2   • cyclobenzaprine (FLEXERIL) 10 MG Tab Take 1 Tab by mouth 1 time daily as needed. 30 Tab 2   • ibuprofen (MOTRIN) 800 MG Tab Take 1 Tab by mouth every 12 hours as needed for Mild Pain or Moderate Pain (take with food). 60 Tab 2   • albuterol 108 (90 BASE) MCG/ACT Aero Soln inhalation aerosol Inhale 2 Puffs by mouth every 6 hours as needed for Shortness of Breath. 8.5 g 3   • loratadine (CLARITIN) 10 MG Tab Take 1 Tab by mouth every day. 30 Tab 2     No current facility-administered medications for this visit.        Social History   Substance Use Topics   •  "Smoking status: Former Smoker     Packs/day: 0.25     Years: 15.00   • Smokeless tobacco: Former User     Quit date: 10/31/2016   • Alcohol use Yes      Comment: rare, beer every couple months       History reviewed. No pertinent past medical history.    Family History   Problem Relation Age of Onset   • Cancer Mother         breast cancer, Ovarian cancer   • Heart Disease Father    • Psychiatry Maternal Aunt        ROS:  Review of Systems   See HPI Above    Exam:  Blood pressure 112/60, pulse 76, temperature 36.9 °C (98.5 °F), resp. rate 16, height 1.829 m (6' 0.01\"), weight 103.4 kg (228 lb), SpO2 94 %. Body mass index is 30.92 kg/m².    General:  Well nourished, well developed male in NAD  HENT:Head is grossly normal. PERRL.  Neck: Supple. Trachea is midline.  Pulmonary: Clear to ausculation .  Normal effort. No rales, ronchi, or wheezing.   Cardiovascular: Regular rate and rhythm.  Abdomen-Abdomen is soft, No tenderness.  Upper extremities-  Good ROM  Lower extremities- neg for edema, redness, tenderness. Walks easily without a limp  Neuro- A & O x 4. Speech clear and appropriate.    Current medications, allergies, and problem list reviewed with patient and updated in EPIC today.    Assessment/Plan:  1. Chronic pain of left knee  Ibuprofen , no more than 800 mg total per day  (with food). Ice prn, Continue stretches and exercises prescribed by Physical Therapy.   2. Obesity (BMI 30-39.9)  Instructed to continue reducing sugar/carbs in diet.,    3. Dyslipidemia  atorvastatin (LIPITOR) 20 MG Tab-refill       Return in about 2 months (around 9/30/2018).  "

## 2018-07-31 NOTE — ASSESSMENT & PLAN NOTE
"todays weight= 228 lbs compared to 6/19/18 = 237 lbs.  Reports he bought a scale and is checking wt at home  Is doing intermittent fasting and eats only through 8 hrs.  Is eating mostly vegetarian and \"still too many carbs still\".     Discussed to continue decrease his grain and pasta\carb input.  States eats cheese pizza and recommend he cut out.     "

## 2018-08-06 RX ORDER — LORATADINE 10 MG/1
TABLET ORAL
Qty: 30 TAB | Refills: 11 | Status: SHIPPED | OUTPATIENT
Start: 2018-08-06 | End: 2019-09-23 | Stop reason: SDUPTHER

## 2018-09-17 DIAGNOSIS — M25.552 HIP PAIN, LEFT: ICD-10-CM

## 2018-09-17 DIAGNOSIS — G89.29 CHRONIC MIDLINE LOW BACK PAIN WITHOUT SCIATICA: ICD-10-CM

## 2018-09-17 DIAGNOSIS — M54.50 CHRONIC MIDLINE LOW BACK PAIN WITHOUT SCIATICA: ICD-10-CM

## 2018-09-17 RX ORDER — IBUPROFEN 800 MG/1
TABLET ORAL
Qty: 60 TAB | Refills: 0 | Status: SHIPPED | OUTPATIENT
Start: 2018-09-17 | End: 2019-08-08 | Stop reason: SDUPTHER

## 2018-10-15 ENCOUNTER — OFFICE VISIT (OUTPATIENT)
Dept: MEDICAL GROUP | Facility: MEDICAL CENTER | Age: 63
End: 2018-10-15
Attending: NURSE PRACTITIONER
Payer: COMMERCIAL

## 2018-10-15 VITALS
HEART RATE: 74 BPM | TEMPERATURE: 98.3 F | OXYGEN SATURATION: 94 % | BODY MASS INDEX: 30.2 KG/M2 | RESPIRATION RATE: 16 BRPM | HEIGHT: 72 IN | DIASTOLIC BLOOD PRESSURE: 80 MMHG | SYSTOLIC BLOOD PRESSURE: 115 MMHG | WEIGHT: 223 LBS

## 2018-10-15 DIAGNOSIS — Z13.0 SCREENING, IRON DEFICIENCY ANEMIA: ICD-10-CM

## 2018-10-15 DIAGNOSIS — Z13.29 SCREENING FOR THYROID DISORDER: ICD-10-CM

## 2018-10-15 DIAGNOSIS — E55.9 VITAMIN D DEFICIENCY: ICD-10-CM

## 2018-10-15 DIAGNOSIS — Z23 NEED FOR INFLUENZA VACCINATION: ICD-10-CM

## 2018-10-15 DIAGNOSIS — E66.9 OBESITY (BMI 30-39.9): ICD-10-CM

## 2018-10-15 DIAGNOSIS — E78.2 MIXED HYPERLIPIDEMIA: ICD-10-CM

## 2018-10-15 DIAGNOSIS — E53.8 VITAMIN B12 DEFICIENCY: ICD-10-CM

## 2018-10-15 DIAGNOSIS — Z91.09 ENVIRONMENTAL ALLERGIES: ICD-10-CM

## 2018-10-15 PROCEDURE — 99213 OFFICE O/P EST LOW 20 MIN: CPT | Performed by: NURSE PRACTITIONER

## 2018-10-15 PROCEDURE — 90686 IIV4 VACC NO PRSV 0.5 ML IM: CPT

## 2018-10-15 PROCEDURE — 99212 OFFICE O/P EST SF 10 MIN: CPT | Performed by: NURSE PRACTITIONER

## 2018-10-15 NOTE — PROGRESS NOTES
Chief Complaint:   Chief Complaint   Patient presents with   • Weight Loss   • Flu Vaccine       HPI:  Jesus is here today for a follow-up on intentional wt loss, diet,  And Flu Vaccine    His PMH includes     Mild Anemia  Right Knee Surgery  Tobacco Use-Smoker (Cigars)  Exertional SOB  Mild normocytic Anemia  Hyperlipidemia  Skin Lesions 2' sun exposure/sunburn  Seborrheic Keratosis and Psoriasis per Colleen Casey (DERM)  Chronic Pain (Left Hip and Low Back)  Right Shoulder DJD (Severe)  Cough-Abn CXR---> CT Scan shows RUL Scarring  Left knee pain, normal xrays  Over-weight     Established with:  Orthopedics- JOCELYN  [facility]     [place]  Report:  No Entries     Review of Records:    5/22/18 Clinic visit for concern about Obesity, Chronic Left knee pain. --> labs ordered, Xray left knee ordered. Referral to Physical Therapy.  OTC MOTRIN AND ICE  6/13/18 Labs CBC shows very mild anemia Hgb= 13.8, CMP normal, Iron Levels normal, A1C= 5.5( ~ 111).  Cholest= 213, Jllcv=209, HDL= 40, LDL= 148, Vit D= 13 (low), Vit B12= 276.   TSH= 3.540 Previous TSH = 1.600 on 4/3/17  6/13/18 Left knee Xray= normal  6/19/18 Clinic visit for f/u on knee pain, weight concerns. Results. RX for Vit D, RX for Vit B12  6/27/18 Physical Therapy appt for knee pain.  7/31/18 Clinic f/u regarding left knee. Weight gain concerns. Continues to bicycle and walk every day. WT has decreased from 237 to 228 on purpose.  To continue Ibuprofen and Lipitor and continue work on diet. Ice to knee prn     Obesity (BMI 30-39.9)  Pt is here to talk about his progress on intentional wt loss.6/19/18  237 lbs, 7/31/18 228 lbs  Today 223 lbs.     States has been eating Vegetarian and only rare cheese.  He is happy about his wt loss. Has tried intermittent fasting sometimes.     He has numerous pics on his phone of healthy vegetable dinners that he shows me.  Congratulated.    Recommend f/u lab testing of lipids and A1C, CMP, TSH.    Environmental  allergies  Typically allergies under control  Rare use of Claritin.  Not needing refill as has not used very often.    Hyperlipidemia  Is taking  Statin and denies any myalgias.  Has changed his diet to Vegetarian w limited cheese.  This has been approx 3 months.  Will recheck panel.      Patient Active Problem List    Diagnosis Date Noted   • Vitamin D deficiency 06/19/2018   • Vitamin B12 deficiency 06/19/2018   • Obesity (BMI 30-39.9) 05/22/2018   • Chronic pain of left knee 05/22/2018   • Normocytic anemia 04/10/2017   • Thrombocytopenia (HCC) 04/10/2017   • Environmental allergies 04/10/2017   • Nasal congestion 08/10/2016   • Tobacco use 05/10/2016   • Osteoarthritis of shoulder 04/07/2016   • Chronic right shoulder pain 02/16/2016   • Left foot pain 11/23/2015   • Cough 11/23/2015   • Hyperlipidemia 08/18/2015   • Skin lesions 08/18/2015   • Hip pain 05/04/2015   • Low back pain 05/04/2015       Allergies:Patient has no known allergies.    Medicines as of today:  Current Outpatient Prescriptions   Medication Sig Dispense Refill   • ibuprofen (MOTRIN) 800 MG Tab TAKE 1 TABLET ORALLY EVERY 8 HOURS IF NEEDED FOR MILD PAIN OR MODERATE PAIN *TAKE WITH FOOD* 60 Tab 0   • loratadine (CLARITIN) 10 MG Tab TAKE 1 TABLET ORALLY ONCE DAILY 30 Tab 11   • atorvastatin (LIPITOR) 20 MG Tab Take 1 Tab by mouth every evening. 90 Tab 2   • cyanocobalamin (VITAMIN B-12) 500 MCG Tab Take 1 Tab by mouth every day. 30 Tab 3   • ergocalciferol (DRISDOL) 40643 UNIT capsule Take 1 Cap by mouth every 7 days. 4 Cap 5   • selenium sulfide 2.5% (SELSUN) 2.5 % Lotion APPLY TO HAIR TWO TIMES PER WEEK FOR 1 WEEK 1 Bottle 2   • atorvastatin (LIPITOR) 20 MG Tab TAKE 1 TABLET ORALLY EVERY EVENING 30 Tab 3   • cyclobenzaprine (FLEXERIL) 10 MG Tab Take 1 Tab by mouth 1 time daily as needed. 30 Tab 2   • ibuprofen (MOTRIN) 800 MG Tab Take 1 Tab by mouth every 12 hours as needed for Mild Pain or Moderate Pain (take with food). 60 Tab 2   •  "albuterol 108 (90 BASE) MCG/ACT Aero Soln inhalation aerosol Inhale 2 Puffs by mouth every 6 hours as needed for Shortness of Breath. 8.5 g 3     No current facility-administered medications for this visit.        Social History   Substance Use Topics   • Smoking status: Former Smoker     Packs/day: 0.25     Years: 15.00   • Smokeless tobacco: Former User     Quit date: 10/31/2016   • Alcohol use Yes      Comment: rare, beer every couple months       History reviewed. No pertinent past medical history.    Family History   Problem Relation Age of Onset   • Cancer Mother         breast cancer, Ovarian cancer   • Heart Disease Father    • Psychiatry Maternal Aunt        ROS:  Review of Systems   See HPI Above    Exam:  Blood pressure 115/80, pulse 74, temperature 36.8 °C (98.3 °F), temperature source Temporal, resp. rate 16, height 1.829 m (6' 0.01\"), weight 101.2 kg (223 lb), SpO2 94 %. Body mass index is 30.24 kg/m².    General:  Well nourished, well developed male in NAD  HENT:Head is grossly normal. PERRL.  Neck: Supple. Trachea is midline.  Pulmonary: Clear to ausculation .  Normal effort. No rales, ronchi, or wheezing.   Cardiovascular: Regular rate and rhythm.  Abdomen-Abdomen is soft, No tenderness.  Upper extremities- Strong = . Good ROM  Lower extremities- neg for edema, redness, tenderness.  Neuro- A & O x 4. Speech clear and appropriate.    Current medications, allergies, and problem list reviewed with patient and updated in [facility] today.    Assessment/Plan:  1. Need for influenza vaccination  Influenza Vaccine Quad Injection >3Y (PF)   2. Obesity (BMI 30-39.9)  HEMOGLOBIN A1C    COMP METABOLIC PANEL    TSH   3. Environmental allergies  To continue intermittent use of Claritin prn   4. Vitamin D deficiency  VITAMIN D,25 HYDROXY   5. Mixed hyperlipidemia  LIPID PROFILE  Continue Atorvastatin.    6. Screening for thyroid disorder  TSH   7. Vitamin B12 deficiency  VITAMIN B12   8. Screening, iron deficiency " anemia  CBC WITH DIFFERENTIAL    IRON/TOTAL IRON BIND       Return in about 2 weeks (around 10/29/2018) for lab results.

## 2018-10-15 NOTE — ASSESSMENT & PLAN NOTE
Typically allergies under control  Rare use of Claritin.  Not needing refill as has not used very often.

## 2018-10-15 NOTE — ASSESSMENT & PLAN NOTE
Pt is here to talk about his progress on intentional wt loss.6/19/18  237 lbs, 7/31/18 228 lbs  Today 223 lbs.     States has been eating Vegetarian and only rare cheese.  He is happy about his wt loss. Has tried intermittent fasting sometimes.

## 2018-10-23 DIAGNOSIS — L21.9 SEBORRHEIC DERMATITIS OF SCALP: ICD-10-CM

## 2018-10-23 DIAGNOSIS — L67.9 HAIR PROBLEM: ICD-10-CM

## 2018-10-23 RX ORDER — SELENIUM SULFIDE 2.5 MG/100ML
LOTION TOPICAL
Qty: 1 BOTTLE | Refills: 2 | Status: SHIPPED | OUTPATIENT
Start: 2018-10-23 | End: 2019-08-08 | Stop reason: SDUPTHER

## 2019-01-05 ENCOUNTER — HOSPITAL ENCOUNTER (OUTPATIENT)
Dept: LAB | Facility: MEDICAL CENTER | Age: 64
End: 2019-01-05
Attending: NURSE PRACTITIONER
Payer: COMMERCIAL

## 2019-01-05 DIAGNOSIS — E53.8 VITAMIN B12 DEFICIENCY: ICD-10-CM

## 2019-01-05 DIAGNOSIS — Z13.0 SCREENING, IRON DEFICIENCY ANEMIA: ICD-10-CM

## 2019-01-05 DIAGNOSIS — Z13.29 SCREENING FOR THYROID DISORDER: ICD-10-CM

## 2019-01-05 DIAGNOSIS — E78.2 MIXED HYPERLIPIDEMIA: ICD-10-CM

## 2019-01-05 DIAGNOSIS — E66.9 OBESITY (BMI 30-39.9): ICD-10-CM

## 2019-01-05 DIAGNOSIS — E55.9 VITAMIN D DEFICIENCY: ICD-10-CM

## 2019-01-05 LAB
25(OH)D3 SERPL-MCNC: 100 NG/ML (ref 30–100)
ALBUMIN SERPL BCP-MCNC: 4.3 G/DL (ref 3.2–4.9)
ALBUMIN/GLOB SERPL: 1.6 G/DL
ALP SERPL-CCNC: 85 U/L (ref 30–99)
ALT SERPL-CCNC: 24 U/L (ref 2–50)
ANION GAP SERPL CALC-SCNC: 7 MMOL/L (ref 0–11.9)
AST SERPL-CCNC: 23 U/L (ref 12–45)
BASOPHILS # BLD AUTO: 1.2 % (ref 0–1.8)
BASOPHILS # BLD: 0.06 K/UL (ref 0–0.12)
BILIRUB SERPL-MCNC: 0.6 MG/DL (ref 0.1–1.5)
BUN SERPL-MCNC: 10 MG/DL (ref 8–22)
CALCIUM SERPL-MCNC: 9.6 MG/DL (ref 8.5–10.5)
CHLORIDE SERPL-SCNC: 103 MMOL/L (ref 96–112)
CHOLEST SERPL-MCNC: 206 MG/DL (ref 100–199)
CO2 SERPL-SCNC: 29 MMOL/L (ref 20–33)
CREAT SERPL-MCNC: 0.9 MG/DL (ref 0.5–1.4)
EOSINOPHIL # BLD AUTO: 0.15 K/UL (ref 0–0.51)
EOSINOPHIL NFR BLD: 3 % (ref 0–6.9)
ERYTHROCYTE [DISTWIDTH] IN BLOOD BY AUTOMATED COUNT: 45.6 FL (ref 35.9–50)
EST. AVERAGE GLUCOSE BLD GHB EST-MCNC: 111 MG/DL
FASTING STATUS PATIENT QL REPORTED: NORMAL
GLOBULIN SER CALC-MCNC: 2.7 G/DL (ref 1.9–3.5)
GLUCOSE SERPL-MCNC: 85 MG/DL (ref 65–99)
HBA1C MFR BLD: 5.5 % (ref 0–5.6)
HCT VFR BLD AUTO: 43.3 % (ref 42–52)
HDLC SERPL-MCNC: 46 MG/DL
HGB BLD-MCNC: 14.2 G/DL (ref 14–18)
IMM GRANULOCYTES # BLD AUTO: 0.01 K/UL (ref 0–0.11)
IMM GRANULOCYTES NFR BLD AUTO: 0.2 % (ref 0–0.9)
IRON SATN MFR SERPL: 32 % (ref 15–55)
IRON SERPL-MCNC: 113 UG/DL (ref 50–180)
LDLC SERPL CALC-MCNC: 132 MG/DL
LYMPHOCYTES # BLD AUTO: 2.53 K/UL (ref 1–4.8)
LYMPHOCYTES NFR BLD: 50.4 % (ref 22–41)
MCH RBC QN AUTO: 31.3 PG (ref 27–33)
MCHC RBC AUTO-ENTMCNC: 32.8 G/DL (ref 33.7–35.3)
MCV RBC AUTO: 95.6 FL (ref 81.4–97.8)
MONOCYTES # BLD AUTO: 0.39 K/UL (ref 0–0.85)
MONOCYTES NFR BLD AUTO: 7.8 % (ref 0–13.4)
NEUTROPHILS # BLD AUTO: 1.88 K/UL (ref 1.82–7.42)
NEUTROPHILS NFR BLD: 37.4 % (ref 44–72)
NRBC # BLD AUTO: 0 K/UL
NRBC BLD-RTO: 0 /100 WBC
PLATELET # BLD AUTO: 173 K/UL (ref 164–446)
PMV BLD AUTO: 10.8 FL (ref 9–12.9)
POTASSIUM SERPL-SCNC: 3.9 MMOL/L (ref 3.6–5.5)
PROT SERPL-MCNC: 7 G/DL (ref 6–8.2)
RBC # BLD AUTO: 4.53 M/UL (ref 4.7–6.1)
SODIUM SERPL-SCNC: 139 MMOL/L (ref 135–145)
TIBC SERPL-MCNC: 350 UG/DL (ref 250–450)
TRIGL SERPL-MCNC: 142 MG/DL (ref 0–149)
TSH SERPL DL<=0.005 MIU/L-ACNC: 2.79 UIU/ML (ref 0.38–5.33)
VIT B12 SERPL-MCNC: 593 PG/ML (ref 211–911)
WBC # BLD AUTO: 5 K/UL (ref 4.8–10.8)

## 2019-01-05 PROCEDURE — 82607 VITAMIN B-12: CPT

## 2019-01-05 PROCEDURE — 83550 IRON BINDING TEST: CPT

## 2019-01-05 PROCEDURE — 80053 COMPREHEN METABOLIC PANEL: CPT

## 2019-01-05 PROCEDURE — 85025 COMPLETE CBC W/AUTO DIFF WBC: CPT

## 2019-01-05 PROCEDURE — 83036 HEMOGLOBIN GLYCOSYLATED A1C: CPT

## 2019-01-05 PROCEDURE — 83540 ASSAY OF IRON: CPT

## 2019-01-05 PROCEDURE — 84443 ASSAY THYROID STIM HORMONE: CPT

## 2019-01-05 PROCEDURE — 80061 LIPID PANEL: CPT

## 2019-01-05 PROCEDURE — 82306 VITAMIN D 25 HYDROXY: CPT

## 2019-01-05 PROCEDURE — 36415 COLL VENOUS BLD VENIPUNCTURE: CPT

## 2019-02-04 DIAGNOSIS — E55.9 VITAMIN D DEFICIENCY: ICD-10-CM

## 2019-02-04 RX ORDER — ERGOCALCIFEROL 1.25 MG/1
CAPSULE ORAL
Qty: 4 CAP | Refills: 2 | Status: SHIPPED | OUTPATIENT
Start: 2019-02-04 | End: 2019-08-08 | Stop reason: SDUPTHER

## 2019-08-08 DIAGNOSIS — L21.9 SEBORRHEIC DERMATITIS OF SCALP: ICD-10-CM

## 2019-08-08 DIAGNOSIS — E55.9 VITAMIN D DEFICIENCY: ICD-10-CM

## 2019-08-08 DIAGNOSIS — L67.9 HAIR PROBLEM: ICD-10-CM

## 2019-08-08 DIAGNOSIS — M25.552 HIP PAIN, LEFT: ICD-10-CM

## 2019-08-08 DIAGNOSIS — G89.29 CHRONIC MIDLINE LOW BACK PAIN WITHOUT SCIATICA: ICD-10-CM

## 2019-08-08 DIAGNOSIS — M54.50 CHRONIC MIDLINE LOW BACK PAIN WITHOUT SCIATICA: ICD-10-CM

## 2019-08-08 NOTE — TELEPHONE ENCOUNTER
1. Name: Jesus Gene Wilkinson     Call Back Number: [phone] (home)   Patient approves a detailed voicemail message: yes    2. Which medication(s) is being requested?   ibuprofen (MOTRIN) 800 MG Tab  selenium sulfide 2.5% (SELSUN) 2.5 % Lotion  vitamin D, Ergocalciferol, (DRISDOL) 67157 units Cap capsule    3. What is the preferred Pharmacy?   [facility]    Patient was informed they may receive a return phone call from our office with any additional questions before processing this request.

## 2019-08-09 RX ORDER — SELENIUM SULFIDE 2.5 MG/100ML
LOTION TOPICAL
Qty: 1 BOTTLE | Refills: 2 | Status: SHIPPED | OUTPATIENT
Start: 2019-08-09 | End: 2019-09-23 | Stop reason: SDUPTHER

## 2019-08-09 RX ORDER — IBUPROFEN 800 MG/1
TABLET ORAL
Qty: 60 TAB | Refills: 2 | Status: SHIPPED | OUTPATIENT
Start: 2019-08-09

## 2019-08-09 RX ORDER — ERGOCALCIFEROL 1.25 MG/1
50000 CAPSULE ORAL
Qty: 4 CAP | Refills: 2 | Status: SHIPPED | OUTPATIENT
Start: 2019-08-09

## 2019-09-23 ENCOUNTER — OFFICE VISIT (OUTPATIENT)
Dept: MEDICAL GROUP | Facility: MEDICAL CENTER | Age: 64
End: 2019-09-23
Attending: NURSE PRACTITIONER
Payer: MEDICAID

## 2019-09-23 VITALS
TEMPERATURE: 98.2 F | OXYGEN SATURATION: 98 % | HEART RATE: 78 BPM | RESPIRATION RATE: 18 BRPM | BODY MASS INDEX: 28.36 KG/M2 | WEIGHT: 221 LBS | HEIGHT: 74 IN | DIASTOLIC BLOOD PRESSURE: 62 MMHG | SYSTOLIC BLOOD PRESSURE: 116 MMHG

## 2019-09-23 DIAGNOSIS — Z91.018 FOOD ALLERGY: ICD-10-CM

## 2019-09-23 DIAGNOSIS — Z59.41 FOOD INSECURITY: ICD-10-CM

## 2019-09-23 DIAGNOSIS — R10.31 RIGHT INGUINAL PAIN: ICD-10-CM

## 2019-09-23 DIAGNOSIS — Z13.228 SCREENING FOR METABOLIC DISORDER: ICD-10-CM

## 2019-09-23 DIAGNOSIS — E66.3 OVERWEIGHT (BMI 25.0-29.9): ICD-10-CM

## 2019-09-23 DIAGNOSIS — Z12.11 SCREEN FOR COLON CANCER: ICD-10-CM

## 2019-09-23 DIAGNOSIS — Z13.0 SCREENING FOR DEFICIENCY ANEMIA: ICD-10-CM

## 2019-09-23 DIAGNOSIS — M25.511 CHRONIC PAIN OF BOTH SHOULDERS: ICD-10-CM

## 2019-09-23 DIAGNOSIS — Z11.59 NEED FOR HEPATITIS C SCREENING TEST: ICD-10-CM

## 2019-09-23 DIAGNOSIS — R06.02 EXERTIONAL SHORTNESS OF BREATH: ICD-10-CM

## 2019-09-23 DIAGNOSIS — E78.2 MIXED HYPERLIPIDEMIA: ICD-10-CM

## 2019-09-23 DIAGNOSIS — R05.9 COUGH: ICD-10-CM

## 2019-09-23 DIAGNOSIS — M25.512 CHRONIC PAIN OF BOTH SHOULDERS: ICD-10-CM

## 2019-09-23 DIAGNOSIS — Z13.6 SCREENING FOR CARDIOVASCULAR CONDITION: ICD-10-CM

## 2019-09-23 DIAGNOSIS — L21.9 SEBORRHEIC DERMATITIS OF SCALP: ICD-10-CM

## 2019-09-23 DIAGNOSIS — J42 CHRONIC BRONCHITIS, UNSPECIFIED CHRONIC BRONCHITIS TYPE (HCC): ICD-10-CM

## 2019-09-23 DIAGNOSIS — L67.9 HAIR PROBLEM: ICD-10-CM

## 2019-09-23 DIAGNOSIS — Z13.21 ENCOUNTER FOR VITAMIN DEFICIENCY SCREENING: ICD-10-CM

## 2019-09-23 DIAGNOSIS — G89.29 CHRONIC PAIN OF BOTH SHOULDERS: ICD-10-CM

## 2019-09-23 PROBLEM — E66.9 OBESITY (BMI 30-39.9): Status: RESOLVED | Noted: 2018-05-22 | Resolved: 2019-09-23

## 2019-09-23 PROCEDURE — 99214 OFFICE O/P EST MOD 30 MIN: CPT | Performed by: NURSE PRACTITIONER

## 2019-09-23 PROCEDURE — 99213 OFFICE O/P EST LOW 20 MIN: CPT | Performed by: NURSE PRACTITIONER

## 2019-09-23 RX ORDER — SELENIUM SULFIDE 2.5 MG/100ML
LOTION TOPICAL
Qty: 1 BOTTLE | Refills: 2 | Status: SHIPPED | OUTPATIENT
Start: 2019-09-23

## 2019-09-23 RX ORDER — ALBUTEROL SULFATE 90 UG/1
2 AEROSOL, METERED RESPIRATORY (INHALATION) EVERY 6 HOURS PRN
Qty: 8.5 G | Refills: 3 | Status: SHIPPED | OUTPATIENT
Start: 2019-09-23

## 2019-09-23 RX ORDER — LORATADINE 10 MG/1
TABLET ORAL
Qty: 30 TAB | Refills: 11 | Status: SHIPPED | OUTPATIENT
Start: 2019-09-23

## 2019-09-23 SDOH — ECONOMIC STABILITY - FOOD INSECURITY: FOOD INSECURITY: Z59.41

## 2019-09-23 ASSESSMENT — ENCOUNTER SYMPTOMS
DIARRHEA: 0
PALPITATIONS: 0
FEVER: 0
COUGH: 1
WEIGHT LOSS: 0
SHORTNESS OF BREATH: 1
CONSTIPATION: 0
CHILLS: 0
BLOOD IN STOOL: 0
WHEEZING: 1
ABDOMINAL PAIN: 0

## 2019-09-23 ASSESSMENT — PATIENT HEALTH QUESTIONNAIRE - PHQ9
SUM OF ALL RESPONSES TO PHQ QUESTIONS 1-9: 3
5. POOR APPETITE OR OVEREATING: 0 - NOT AT ALL
CLINICAL INTERPRETATION OF PHQ2 SCORE: 1

## 2019-09-23 NOTE — ASSESSMENT & PLAN NOTE
Notices right inguinal pain when doing strenuous exercise and weight lifting.  No palpable mass.  Pain slowly improves with rest.

## 2019-09-23 NOTE — PROGRESS NOTES
Chief Complaint   Patient presents with   • Establish Care       Subjective:     HPI:   Jesus Rowe is a 64 y.o. male here to establish care, HLD concerns,  and to discuss the evaluation and management of:      Hyperlipidemia  Stopped taking statin at last refill, approx 4 months. He would like to stop his statin if possible as he has become vegetarian.     Chronic pain of both shoulders  Right shoulder pain started in his 30s.  He has DJD in that joint. His left shoulder started hurting in a similar manner this year. He has decreased ROM in both joints R>L.  He takes ibuprofen with some relief.    Food allergy  Noticed increase in allergies with certain foods like nuts and grainy foods.  He reports increase in mucous production, cough, rhinorrhea. He is requesting allergy testing.     Overweight (BMI 25.0-29.9)  Has become vegetarian and lost weight.  He is exercising and trying to loose more weight.      Right inguinal pain  Notices right inguinal pain when doing strenuous exercise and weight lifting.  No palpable mass.  Pain slowly improves with rest.      Food insecurity  Food bank prescription provided.      ROS  Review of Systems   Constitutional: Negative for chills, fever, malaise/fatigue and weight loss.   Respiratory: Positive for cough, shortness of breath and wheezing.    Cardiovascular: Negative for chest pain, palpitations and leg swelling.   Gastrointestinal: Negative for abdominal pain, blood in stool, constipation and diarrhea.         No Known Allergies    Current medicines (including changes today)  Current Outpatient Medications   Medication Sig Dispense Refill   • selenium sulfide 2.5% (SELSUN) 2.5 % Lotion APPLY TO HAIR TWO TIMES PER WEEK FOR 1 WEEK 1 Bottle 2   • loratadine (CLARITIN) 10 MG Tab TAKE 1 TABLET ORALLY ONCE DAILY 30 Tab 11   • albuterol 108 (90 Base) MCG/ACT Aero Soln inhalation aerosol Inhale 2 Puffs by mouth every 6 hours as needed for Shortness of Breath. 8.5 g 3   •  ibuprofen (MOTRIN) 800 MG Tab TAKE 1 TABLET ORALLY EVERY 8 HOURS IF NEEDED FOR MILD PAIN OR MODERATE PAIN *TAKE WITH FOOD* 60 Tab 2   • vitamin D, Ergocalciferol, (DRISDOL) 87835 units Cap capsule Take 1 Cap by mouth every 7 days. 4 Cap 2   • cyanocobalamin (VITAMIN B-12) 500 MCG Tab Take 1 Tab by mouth every day. 30 Tab 3   • atorvastatin (LIPITOR) 20 MG Tab TAKE 1 TABLET ORALLY EVERY EVENING 30 Tab 3     No current facility-administered medications for this visit.      He  has a past medical history of Hyperlipidemia.  He  has a past surgical history that includes appendectomy and [id number].  Social History     Tobacco Use   • Smoking status: Former Smoker     Packs/day: 0.25     Years: 15.00     Pack years: 3.75   • Smokeless tobacco: Former User     Quit date: 10/31/2016   Substance Use Topics   • Alcohol use: Not Currently     Comment: rare, beer every couple months   • Drug use: No       Family History   Problem Relation Age of Onset   • Cancer Mother         breast cancer, Ovarian cancer   • Heart Disease Father    • Psychiatric Illness Maternal Aunt      Family Status   Relation Name Status   • Mo     • Fa     • Sis  Alive   • Bro  Alive   • MGMo     • MGFa     • PGMo     • PGFa     • MAunt  (Not Specified)       Patient Active Problem List    Diagnosis Date Noted   • Chronic pain of both shoulders 2019   • Food allergy 2019   • Overweight (BMI 25.0-29.9) 2019   • Right inguinal pain 2019   • Food insecurity 2019   • Vitamin D deficiency 2018   • Vitamin B12 deficiency 2018   • Chronic pain of left knee 2018   • Normocytic anemia 04/10/2017   • Thrombocytopenia (HCC) 04/10/2017   • Environmental allergies 04/10/2017   • Nasal congestion 08/10/2016   • Tobacco use 05/10/2016   • Osteoarthritis of shoulder 2016   • Chronic right shoulder pain 2016   • Left foot pain 2015   • Cough 2015   •  "Hyperlipidemia 08/18/2015   • Skin lesions 08/18/2015   • Hip pain 05/04/2015   • Low back pain 05/04/2015          Objective:     /62 (BP Location: Left arm, Patient Position: Sitting, BP Cuff Size: Adult)   Pulse 78   Temp 36.8 °C (98.2 °F) (Temporal)   Resp 18   Ht 1.88 m (6' 2\")   Wt 100.2 kg (221 lb)   SpO2 98%  Body mass index is 28.37 kg/m².    Physical Exam:  Physical Exam   Constitutional: He is oriented to person, place, and time and well-developed, well-nourished, and in no distress. No distress.   HENT:   Head: Normocephalic.   Right Ear: Tympanic membrane and external ear normal.   Left Ear: Tympanic membrane and external ear normal.   Eyes: Pupils are equal, round, and reactive to light. Conjunctivae and EOM are normal.   Neck: Normal range of motion. Neck supple. No tracheal deviation present.   Cardiovascular: Normal rate, regular rhythm, normal heart sounds and intact distal pulses.   Pulmonary/Chest: Effort normal and breath sounds normal.   Abdominal: Soft. Bowel sounds are normal.   Musculoskeletal: Normal range of motion.   Lymphadenopathy:        Head (right side): No preauricular adenopathy present.        Head (left side): No preauricular adenopathy present.     He has no cervical adenopathy.   Neurological: He is alert and oriented to person, place, and time. He has normal sensation, normal strength and intact cranial nerves. Gait normal.   Skin: Skin is warm and dry.   Psychiatric: Affect and judgment normal.          Assessment and Plan:     The following treatment plan was discussed:    1. Mixed hyperlipidemia  -we will check his lipid profile, if appropriate we will stay off the statin.   2. Chronic pain of both shoulders  REFERRAL TO PHYSICAL THERAPY Reason for Therapy: Eval/Treat/Report    REFERRAL TO PHYSIATRY (PMR)  -Patient reports that physical therapy has been helpful in the past.  He is interested in further work-up by physiatry.   3. Hair problem  selenium sulfide " 2.5% (SELSUN) 2.5 % Lotion   4. Seborrheic dermatitis of scalp  selenium sulfide 2.5% (SELSUN) 2.5 % Lotion  -Continue use as needed   5. Cough  albuterol 108 (90 Base) MCG/ACT Aero Soln inhalation aerosol  -Consider adding Claritin back into his routine as he reports increased allergic rhinitis symptoms in the spring and the fall.   6. Chronic bronchitis, unspecified chronic bronchitis type (HCC)  albuterol 108 (90 Base) MCG/ACT Aero Soln inhalation aerosol  -Patient reports that his shortness of breath is relatively well-controlled at this point.  He reports he does not use his inhaler often.   7.   Food insecurity  food bank prescription provided  -Well-controlled, see above.   8. Food allergy  REFERRAL TO ALLERGY  -Patient is concerned that he is developing some food allergies and would like to establish with an allergist.   9. Screening for metabolic disorder  Comp Metabolic Panel    HEMOGLOBIN A1C    MICROALBUMIN CREAT RATIO URINE    TSH WITH REFLEX TO FT4   10. Screening for cardiovascular condition  Lipid Profile   11. Screening for deficiency anemia  CBC WITHOUT DIFFERENTIAL   12. Encounter for vitamin deficiency screening  VITAMIN D,25 HYDROXY    VITAMIN B12   13. Need for hepatitis C screening test  HEP C VIRUS ANTIBODY   14. Overweight (BMI 25.0-29.9)  REFERRAL TO [facility]PR[facility]GRAMS (HIP) Services Requested: Physician Medical Weight Management Program, Registered Dietitian for Medical Nutrition Therapy; Reason for Referral? BMI>25 and 1 or more co-morbidities, including DM2, HTN,...  -Patient is interested in nutritional assistance for weight loss.   15.   Screen for colon cancer  fit test ordered   16. Right inguinal pain  US-INGUINAL HERNIA  -Possible concern for hernia, also considering scarring status post appendectomy.       Any change or worsening of signs or symptoms, patient encouraged to follow-up or report to emergency room for further evaluation. Patient verbalizes  understanding and agrees.    Follow-Up: No follow-ups on file.  Will reach out to the patient regarding his lab results.  If appropriate, I will order ergocalciferol 50,000 units, B12, and statin.      PLEASE NOTE: This dictation was created using voice recognition software. I have made every reasonable attempt to correct obvious errors, but I expect that there are errors of grammar and possibly content that I did not discover before finalizing the note.

## 2019-09-23 NOTE — ASSESSMENT & PLAN NOTE
Right shoulder pain started in his 30s.  He has DJD in that joint. His left shoulder started hurting in a similar manner this year. He has decreased ROM in both joints R>L.  He takes ibuprofen with some relief.

## 2019-09-23 NOTE — ASSESSMENT & PLAN NOTE
Stopped taking statin at last refill, approx 4 months. He would like to stop his statin if possible as he has become vegetarian.

## 2019-09-23 NOTE — ASSESSMENT & PLAN NOTE
Noticed increase in allergies with certain foods like nuts and grainy foods.  He reports increase in mucous production, cough, rhinorrhea. He is requesting allergy testing.

## 2021-03-03 DIAGNOSIS — Z23 NEED FOR VACCINATION: ICD-10-CM

## 2023-03-17 NOTE — ASSESSMENT & PLAN NOTE
Has on and off nasal congestion.  Denies current congestion.   Has had some sneezing today.  Denies cough.   Griseofulvin Pregnancy And Lactation Text: This medication is Pregnancy Category X and is known to cause serious birth defects. It is unknown if this medication is excreted in breast milk but breast feeding should be avoided.